# Patient Record
Sex: FEMALE | Race: WHITE | Employment: OTHER | URBAN - METROPOLITAN AREA
[De-identification: names, ages, dates, MRNs, and addresses within clinical notes are randomized per-mention and may not be internally consistent; named-entity substitution may affect disease eponyms.]

---

## 2018-08-16 ENCOUNTER — HOSPITAL ENCOUNTER (OUTPATIENT)
Dept: LAB | Age: 83
Discharge: HOME | End: 2018-08-16

## 2018-08-16 PROCEDURE — 88305 TISSUE EXAM BY PATHOLOGIST: CPT | Performed by: SURGERY

## 2019-11-18 ENCOUNTER — OFFICE VISIT (OUTPATIENT)
Dept: GERIATRIC MEDICINE | Age: 84
End: 2019-11-18

## 2019-11-18 VITALS
RESPIRATION RATE: 16 BRPM | OXYGEN SATURATION: 97 % | BODY MASS INDEX: 23.17 KG/M2 | HEART RATE: 62 BPM | WEIGHT: 107.4 LBS | DIASTOLIC BLOOD PRESSURE: 62 MMHG | SYSTOLIC BLOOD PRESSURE: 108 MMHG | HEIGHT: 57 IN

## 2019-11-18 DIAGNOSIS — I25.5 ISCHEMIC CARDIOMYOPATHY: ICD-10-CM

## 2019-11-18 DIAGNOSIS — Z66 DNR (DO NOT RESUSCITATE): ICD-10-CM

## 2019-11-18 DIAGNOSIS — H90.6 MIXED CONDUCTIVE AND SENSORINEURAL HEARING LOSS OF BOTH EARS: ICD-10-CM

## 2019-11-18 DIAGNOSIS — I25.10 CORONARY ARTERY DISEASE INVOLVING NATIVE CORONARY ARTERY OF NATIVE HEART WITHOUT ANGINA PECTORIS: ICD-10-CM

## 2019-11-18 DIAGNOSIS — I48.0 PAROXYSMAL ATRIAL FIBRILLATION (HCC): ICD-10-CM

## 2019-11-18 DIAGNOSIS — E78.00 PURE HYPERCHOLESTEROLEMIA: ICD-10-CM

## 2019-11-18 DIAGNOSIS — I50.42 CHRONIC COMBINED SYSTOLIC AND DIASTOLIC CONGESTIVE HEART FAILURE (HCC): Primary | ICD-10-CM

## 2019-11-18 DIAGNOSIS — M81.0 AGE-RELATED OSTEOPOROSIS WITHOUT CURRENT PATHOLOGICAL FRACTURE: ICD-10-CM

## 2019-11-18 DIAGNOSIS — Z12.39 SCREENING FOR MALIGNANT NEOPLASM OF BREAST: ICD-10-CM

## 2019-11-18 DIAGNOSIS — I10 ESSENTIAL HYPERTENSION: ICD-10-CM

## 2019-11-18 DIAGNOSIS — Z12.31 ENCOUNTER FOR SCREENING MAMMOGRAM FOR MALIGNANT NEOPLASM OF BREAST: ICD-10-CM

## 2019-11-18 DIAGNOSIS — Z71.89 ACP (ADVANCE CARE PLANNING): ICD-10-CM

## 2019-11-18 DIAGNOSIS — H61.23 BILATERAL IMPACTED CERUMEN: ICD-10-CM

## 2019-11-18 DIAGNOSIS — Z76.89 ENCOUNTER TO ESTABLISH CARE: ICD-10-CM

## 2019-11-18 DIAGNOSIS — I36.1 NONRHEUMATIC TRICUSPID VALVE REGURGITATION: ICD-10-CM

## 2019-11-18 DIAGNOSIS — Z00.00 MEDICARE ANNUAL WELLNESS VISIT, SUBSEQUENT: ICD-10-CM

## 2019-11-18 PROBLEM — I34.0 MITRAL REGURGITATION: Chronic | Status: ACTIVE | Noted: 2019-11-18

## 2019-11-18 PROBLEM — E55.9 VITAMIN D DEFICIENCY: Chronic | Status: ACTIVE | Noted: 2019-11-18

## 2019-11-18 PROBLEM — I07.1 TRICUSPID REGURGITATION: Chronic | Status: ACTIVE | Noted: 2019-11-18

## 2019-11-18 PROBLEM — I50.9 CONGESTIVE HEART FAILURE (HCC): Chronic | Status: ACTIVE | Noted: 2019-11-18

## 2019-11-18 PROBLEM — I87.8 STASIS, VENOUS: Chronic | Status: ACTIVE | Noted: 2019-11-18

## 2019-11-18 PROBLEM — I42.9 CARDIOMYOPATHY (HCC): Chronic | Status: ACTIVE | Noted: 2019-11-18

## 2019-11-18 PROBLEM — E78.5 HYPERLIPEMIA: Chronic | Status: ACTIVE | Noted: 2019-11-18

## 2019-11-18 RX ORDER — ASPIRIN 81 MG/1
TABLET ORAL DAILY
COMMUNITY

## 2019-11-18 RX ORDER — FUROSEMIDE 40 MG/1
40 TABLET ORAL DAILY
Refills: 3 | Status: ON HOLD | COMMUNITY
Start: 2019-09-30 | End: 2021-06-12 | Stop reason: SDUPTHER

## 2019-11-18 RX ORDER — SPIRONOLACTONE 25 MG/1
25 TABLET ORAL DAILY
Refills: 2 | Status: ON HOLD | COMMUNITY
Start: 2019-09-30 | End: 2021-06-12 | Stop reason: SDUPTHER

## 2019-11-18 RX ORDER — CALCIUM CARB/VITAMIN D3/VIT K1 650MG-12.5
1 TABLET,CHEWABLE ORAL DAILY
Qty: 100 PIECE | Refills: 4 | Status: ON HOLD | OUTPATIENT
Start: 2019-11-18 | End: 2021-06-10

## 2019-11-18 NOTE — PROGRESS NOTES
Reason for Visit   Ani Villalobos is a 80 y.o. female patient who presents today for :  Chief Complaint   Patient presents with   Fabby Nguyen Establish Care     Patient here with her daughter to establish care with PCP    Annual Wellness Visit     Yearly visit     Treatment Plan / Follow Up: Follow-up and Dispositions    · Return in about 1 week (around 11/25/2019) for in office to review imaging or labs results. - New patient establishing today as her  of many years just passed in my care earlier this year and she has not had her medical care updated since dealing with his needs. She reports being a healthy aging woman without many complaints of illness. She sees the Cardiologist Dr. Vishal To regularly due to her chronic congestive heart failure and management of that. Otherwise she see's an eye dr at [facility] for her cataracts but has only had to have 1 fixed and the other is still not ready to be fixed yet. - SOB- she has some complaints of SOB with activity or with exertion of energy. She has a significant history of heart failure, CAD, and cardiomyopathy, managed with Dr. Vishal To. She has overactive bladder when she doesn't take her diuretics until later in the day causing her to be up 3-4 times per night with nocturia but if she takes it early in the AM she is able to sleep soundly without disturbance. - Wellness initiatives - Dexa scan previously done many years ago, she would like to refrain from having that done if possible as she knows she has osteoporosis but she does agree to take the Vi actin oral calcium vitamin d chews daily in the Caramel flavor to help with her bone health. She reports a fracture of her left upper arm at age 79 but did not have to have it surgically fixed it was healed by medical treatment. I have ordered them to be delivered. - Mammogram - she has not had a follow up in many years.  She is okay with having the test and I have ordered this now for her daughter to take her to get it done. - Ear wax- both ears are impacted with wax. I have attempted as well as Dr. Neville Springer attempted to remove the wax for her hearing to be better. We had to go to the water device with use of tools to remove the ear wax. She tolerated this well. She is going to use the debrox once weekly for this to maintain the wax from re-oc cumulating. Diagnosis:        ICD-10-CM ICD-9-CM    1. Chronic combined systolic and diastolic congestive heart failure (HCC) I50.42 428.42 AMYLASE     428.0 CBC WITH AUTOMATED DIFF      COLLECTION VENOUS BLOOD,VENIPUNCTURE      HEMOGLOBIN A1C WITH EAG      LIPASE      LIPID PANEL      MAGNESIUM      METABOLIC PANEL, COMPREHENSIVE      MICROALBUMIN, UR, RAND W/ MICROALB/CREAT RATIO      NT-PRO BNP      THYROID PANEL W/TSH      UA WITH REFLEX MICRO AND CULTURE      VITAMIN B12 & FOLATE      VITAMIN D, 25 HYDROXY      PHOSPHORUS      CREATINE KINASE (CK), MB/TOTAL      TROPONIN I   2. Pure hypercholesterolemia E78.00 272.0 AMYLASE      CBC WITH AUTOMATED DIFF      COLLECTION VENOUS BLOOD,VENIPUNCTURE      HEMOGLOBIN A1C WITH EAG      LIPASE      LIPID PANEL      MAGNESIUM      METABOLIC PANEL, COMPREHENSIVE      MICROALBUMIN, UR, RAND W/ MICROALB/CREAT RATIO      NT-PRO BNP      THYROID PANEL W/TSH      UA WITH REFLEX MICRO AND CULTURE      VITAMIN B12 & FOLATE      VITAMIN D, 25 HYDROXY      PHOSPHORUS      CREATINE KINASE (CK), MB/TOTAL      TROPONIN I   3.  Ischemic cardiomyopathy I25.5 414.8 AMYLASE      CBC WITH AUTOMATED DIFF      COLLECTION VENOUS BLOOD,VENIPUNCTURE      HEMOGLOBIN A1C WITH EAG      LIPASE      LIPID PANEL      MAGNESIUM      METABOLIC PANEL, COMPREHENSIVE      MICROALBUMIN, UR, RAND W/ MICROALB/CREAT RATIO      NT-PRO BNP      THYROID PANEL W/TSH      UA WITH REFLEX MICRO AND CULTURE      VITAMIN B12 & FOLATE      VITAMIN D, 25 HYDROXY      PHOSPHORUS      CREATINE KINASE (CK), MB/TOTAL      TROPONIN I   4. Paroxysmal atrial fibrillation (HCC) I48.0 427.31 AMYLASE      CBC WITH AUTOMATED DIFF      COLLECTION VENOUS BLOOD,VENIPUNCTURE      HEMOGLOBIN A1C WITH EAG      LIPASE      LIPID PANEL      MAGNESIUM      METABOLIC PANEL, COMPREHENSIVE      MICROALBUMIN, UR, RAND W/ MICROALB/CREAT RATIO      NT-PRO BNP      THYROID PANEL W/TSH      UA WITH REFLEX MICRO AND CULTURE      VITAMIN B12 & FOLATE      VITAMIN D, 25 HYDROXY      PHOSPHORUS      CREATINE KINASE (CK), MB/TOTAL      TROPONIN I   5.  Coronary artery disease involving native coronary artery of native heart without angina pectoris I25.10 414.01 AMYLASE      CBC WITH AUTOMATED DIFF      COLLECTION VENOUS BLOOD,VENIPUNCTURE      HEMOGLOBIN A1C WITH EAG      LIPASE      LIPID PANEL      MAGNESIUM      METABOLIC PANEL, COMPREHENSIVE      MICROALBUMIN, UR, RAND W/ MICROALB/CREAT RATIO      NT-PRO BNP      THYROID PANEL W/TSH      UA WITH REFLEX MICRO AND CULTURE      VITAMIN B12 & FOLATE      VITAMIN D, 25 HYDROXY      PHOSPHORUS      CREATINE KINASE (CK), MB/TOTAL      TROPONIN I   6. Essential hypertension I10 401.9 AMYLASE      CBC WITH AUTOMATED DIFF      COLLECTION VENOUS BLOOD,VENIPUNCTURE      HEMOGLOBIN A1C WITH EAG      LIPASE      LIPID PANEL      MAGNESIUM      METABOLIC PANEL, COMPREHENSIVE      MICROALBUMIN, UR, RAND W/ MICROALB/CREAT RATIO      NT-PRO BNP      THYROID PANEL W/TSH      UA WITH REFLEX MICRO AND CULTURE      VITAMIN B12 & FOLATE      VITAMIN D, 25 HYDROXY      PHOSPHORUS      CREATINE KINASE (CK), MB/TOTAL      TROPONIN I   7. Age-related osteoporosis without current pathological fracture M81.0 733.01 AMYLASE      CBC WITH AUTOMATED DIFF      COLLECTION VENOUS BLOOD,VENIPUNCTURE      HEMOGLOBIN A1C WITH EAG      LIPASE      LIPID PANEL      MAGNESIUM      METABOLIC PANEL, COMPREHENSIVE      MICROALBUMIN, UR, RAND W/ MICROALB/CREAT RATIO      NT-PRO BNP      THYROID PANEL W/TSH      UA WITH REFLEX MICRO AND CULTURE      VITAMIN B12 & FOLATE      VITAMIN D, 25 HYDROXY PHOSPHORUS      CREATINE KINASE (CK), MB/TOTAL      TROPONIN I   8. Nonrheumatic tricuspid valve regurgitation I36.1 424.2 AMYLASE      CBC WITH AUTOMATED DIFF      COLLECTION VENOUS BLOOD,VENIPUNCTURE      HEMOGLOBIN A1C WITH EAG      LIPASE      LIPID PANEL      MAGNESIUM      METABOLIC PANEL, COMPREHENSIVE      MICROALBUMIN, UR, RAND W/ MICROALB/CREAT RATIO      NT-PRO BNP      THYROID PANEL W/TSH      UA WITH REFLEX MICRO AND CULTURE      VITAMIN B12 & FOLATE      VITAMIN D, 25 HYDROXY      PHOSPHORUS      CREATINE KINASE (CK), MB/TOTAL      TROPONIN I   9. Screening for malignant neoplasm of breast Z12.39 V76.10 BRICE 3D GLEN W MAMMO BI SCREENING INCL CAD   8. Encounter for screening mammogram for malignant neoplasm of breast  Z12.31 V76.12 BRICE 3D GLEN W MAMMO BI SCREENING INCL CAD   6. Bilateral impacted cerumen H61.23 380.4 REMOVE IMPACTED EAR WAX      carbamide peroxide (DEBROX) 6.5 % otic solution   12. 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 Encounter to establish care Z76.89 V65.8    14. Medicare annual wellness visit, subsequent Z00.00 V70.0 DO NOT RESUSCITATE      PA ANNUAL ALCOHOL SCREEN 15 MIN      PA INTENS BEHAVE THER CARDIO DX      PA DEPRESSION SCREEN ANNUAL      PA  BENEFIT/RISK AREDS FOR MAC DEGENERATION      PA MEDICATION LIST DOCUMENTED IN MEDICAL RECORD      PA PRESENCE/ABSENCE URINARY INCONTINENCE ASSESSED      PA PT FALLS ASSESS DOC 0-1 FALLS W/OUT INJ PAST YR   15. ACP (advance care planning) Z71.89 V65.49 DO NOT RESUSCITATE      PA ANNUAL ALCOHOL SCREEN 15 MIN      PA INTENS BEHAVE THER CARDIO DX      PA DEPRESSION SCREEN ANNUAL      PA  BENEFIT/RISK AREDS FOR MAC DEGENERATION      PA MEDICATION LIST DOCUMENTED IN MEDICAL RECORD      PA PRESENCE/ABSENCE URINARY INCONTINENCE ASSESSED      PA PT FALLS ASSESS DOC 0-1 FALLS W/OUT INJ PAST YR   16.  DNR (do not resuscitate) Z66 V49.86 DO NOT RESUSCITATE      LA ANNUAL ALCOHOL SCREEN 15 MIN      LA INTENS BEHAVE THER CARDIO DX      LA DEPRESSION SCREEN ANNUAL      LA  BENEFIT/RISK AREDS FOR MAC DEGENERATION      LA MEDICATION LIST DOCUMENTED IN MEDICAL RECORD      LA PRESENCE/ABSENCE URINARY INCONTINENCE ASSESSED      LA PT FALLS ASSESS DOC 0-1 FALLS W/OUT INJ PAST YR        Orders Placed This Encounter    BRICE 3D GLEN W MAMMO BI SCREENING INCL CAD     Standing Status:   Future     Standing Expiration Date:   1/18/2020     Order Specific Question:   Reason for Exam     Answer:   screening for malignant neoplasia bilateral breast    AMYLASE    CBC WITH AUTOMATED DIFF    HEMOGLOBIN A1C WITH EAG    LIPASE    LIPID PANEL    MAGNESIUM    METABOLIC PANEL, COMPREHENSIVE    MICROALBUMIN, UR, RAND W/ MICROALB/CREAT RATIO    NT-PRO BNP    THYROID PANEL W/TSH     Free thyroxine index; T3 uptake (THBR); thyroid-stimulating hormone (TSH); thyroxine (T4)    UA WITH REFLEX MICRO AND CULTURE    VITAMIN B12 & FOLATE    VITAMIN D, 25 HYDROXY    PHOSPHORUS    CREATINE KINASE (CK), MB/TOTAL    TROPONIN I    DO NOT RESUSCITATE    LA PRESENCE/ABSENCE URINARY INCONTINENCE ASSESSED    LA PT FALLS ASSESS DOC 0-1 FALLS W/OUT INJ PAST YR    LA ANNUAL ALCOHOL SCREEN 15 MIN    LA INTENS BEHAVE THER CARDIO DX    LA DEPRESSION SCREEN ANNUAL    LA  BENEFIT/RISK AREDS FOR MAC DEGENERATION    LA MEDICATION LIST DOCUMENTED IN MEDICAL RECORD    COLLECTION VENOUS BLOOD,VENIPUNCTURE    REMOVE IMPACTED EAR WAX    spironolactone (ALDACTONE) 25 mg tablet     Sig: Take 25 mg by mouth daily. Refill:  2    furosemide (LASIX) 40 mg tablet     Sig: Take 40 mg by mouth daily. Refill:  3    aspirin delayed-release 81 mg tablet     Sig: Take  by mouth daily.  carbamide peroxide (DEBROX) 6.5 % otic solution     Sig: Place 5 drops in both ear canals once weekly before bed to prevent cerumen production.      Dispense:  7.5 mL     Refill:  10     Deliver to [place]      Objective:     Vitals:    11/18/19 1104   BP: 108/62   Pulse: 62   Resp: 16   SpO2: 97%   Weight: 107 lb 6.4 oz (48.7 kg)   Height: 4' 9.09\" (1.45 m)     Wt Readings from Last 3 Encounters:   11/18/19 107 lb 6.4 oz (48.7 kg)     BP Readings from Last 3 Encounters:   11/18/19 108/62     Review of Systems   Constitutional: Negative for activity change, appetite change, chills, fatigue and fever. HENT: Positive for hearing loss. Negative for congestion, dental problem, postnasal drip, sinus pressure, sneezing, sore throat and trouble swallowing. Eyes: Negative for discharge, redness and visual disturbance. Respiratory: Positive for shortness of breath. Negative for apnea, cough, chest tightness and wheezing. Cardiovascular: Positive for leg swelling. Negative for chest pain and palpitations. Gastrointestinal: Negative for abdominal distention, abdominal pain, blood in stool, constipation, diarrhea, nausea and vomiting. Endocrine: Negative for polydipsia, polyphagia and polyuria. Genitourinary: Negative for flank pain, frequency and urgency. Musculoskeletal: Negative for arthralgias, gait problem, joint swelling and neck pain. Skin: Negative for color change, pallor, rash and wound. Allergic/Immunologic: Negative for environmental allergies and food allergies. Neurological: Negative for dizziness, tremors, weakness, light-headedness, numbness and headaches. Hematological: Negative for adenopathy. Psychiatric/Behavioral: Negative for agitation, confusion and sleep disturbance. The patient is not nervous/anxious. Physical Exam   Constitutional: She is oriented to person, place, and time and well-developed, well-nourished, and in no distress. Vital signs are normal. She appears to not be writhing in pain, not malnourished and not dehydrated. She appears healthy. She does not have a sickly appearance. No distress.    Thin, frail, fragile, elderly,  female. Presents today with one of her 4 daughters to establish care since her  passed away about 4-5 months ago. HENT:   Head: Normocephalic and atraumatic. Right Ear: Tympanic membrane and external ear normal. A foreign body is present. Decreased hearing is noted. Left Ear: Tympanic membrane and external ear normal. A foreign body is present. Decreased hearing is noted. Nose: Nose normal.   Mouth/Throat: Uvula is midline, oropharynx is clear and moist and mucous membranes are normal. Mucous membranes are not pale, not dry and not cyanotic. No oral lesions. No uvula swelling. No posterior oropharyngeal edema or posterior oropharyngeal erythema. Bilateral Ear Canal Irrigation Procedure:    Procedure discussed with patient including risks and benefits. Gardenia Meigs has no further questions at this time. Supplies obtained. Ear irrigation tools obtained and bottle filled with 1:1 peroxide and warm water. Left ear canal irrigation using the soft flexible \"Elephant\" ear system. Hard impacted cerumen was removed with instrumentation. Right ear canal irrigation using the soft flexible \"Elephant\" ear system. Hard impacted cerumen was removed with instrumentation. Right ear was: 100% occluded with hard cerumen. Left ear was: 100% occluded with hard cerumen. Following the procedure the Right TM was visualized and noted to be intact. The Left TM was visualized and noted to be intact. External canal is without abrasions or erythema. Orders were written to utilize Debrox in each ear once weekly to keep the cerumen from returning. [facility] tolerated the procedure well with no complications. Eyes: Pupils are equal, round, and reactive to light. Conjunctivae, EOM and lids are normal. Lids are everted and swept, no foreign bodies found. Neck: Trachea normal, normal range of motion and full passive range of motion without pain. Neck supple. No hepatojugular reflux and no JVD present. Carotid bruit is not present. No thyromegaly present. Cardiovascular: Regular rhythm, S1 normal, S2 normal, intact distal pulses and normal pulses. Frequent extrasystoles are present. Bradycardia present. Exam reveals distant heart sounds. Exam reveals no gallop and no friction rub. No murmur heard. Scant non pitting edema present in bilateral lower extremities. Pulmonary/Chest: Effort normal and breath sounds normal.   Abdominal: Soft. Normal appearance and bowel sounds are normal. There is no hepatosplenomegaly. There is no tenderness. There is no CVA tenderness. Neurological: She is alert and oriented to person, place, and time. She has normal motor skills, normal sensation, normal strength, normal reflexes and intact cranial nerves. She displays atrophy. She displays no weakness, facial symmetry and normal stance. She exhibits normal muscle tone. Gait normal. Coordination and gait normal. GCS score is 15. Skin: Skin is warm, dry and intact. No bruising and no rash noted. She is not diaphoretic. No cyanosis. No pallor. Nails show no clubbing. Psychiatric: Mood, memory, affect and judgment normal.   Baseline mood and affect unchanged from normal.    Nursing note and vitals reviewed. Subjective: Allergies   Allergen Reactions    Evista [Raloxifene] Other (comments)    Levaquin [Levofloxacin] Unknown (comments)    Pcn [Penicillins] Unknown (comments)     Prior to Admission medications    Medication Sig Start Date End Date Taking? Authorizing Provider   spironolactone (ALDACTONE) 25 mg tablet Take 25 mg by mouth daily. 9/30/19  Yes Provider, Historical   furosemide (LASIX) 40 mg tablet Take 40 mg by mouth daily. 9/30/19  Yes Provider, Historical   aspirin delayed-release 81 mg tablet Take  by mouth daily.    Yes Provider, Historical   carbamide peroxide (DEBROX) 6.5 % otic solution Place 5 drops in both ear canals once weekly before bed to prevent cerumen production.  11/18/19  Yes cR Hartley NP     Past Medical History:   Diagnosis Date    CAD (coronary artery disease)     Cardiomyopathy ([address]     Congestive heart failure (HCC)     HTN (hypertension)     Hyperlipemia     Mitral regurgitation     Osteoporosis     Paroxysmal atrial fibrillation (HCC)     Stasis, venous     Tricuspid regurgitation     Vitamin D deficiency      Past Surgical History:   Procedure Laterality Date    HX APPENDECTOMY      HX COLONOSCOPY  06/2004    HX TONSIL AND ADENOIDECTOMY      HX TUBAL LIGATION        Social History     Tobacco Use    Smoking status: Never Smoker    Smokeless tobacco: Never Used   Substance Use Topics    Alcohol use: Never     Frequency: Never    Drug use: Never      Family History   Problem Relation Age of Onset    Heart Failure Mother     Heart Failure Father     Thyroid Cancer Sister     Breast Cancer Other      Functional Assessment:   ADL FUNCTIONALITY:  ADL Assessment 11/18/2019   Feeding yourself No Help Needed   Getting from bed to chair No Help Needed   Getting dressed No Help Needed   Bathing or showering No Help Needed   Walk across the room (includes cane/walker) No Help Needed   Using the telphone No Help Needed   Taking your medications No Help Needed   Preparing meals No Help Needed   Managing money (expenses/bills) No Help Needed   Moderately strenuous housework (laundry) No Help Needed   Shopping for personal items (toiletries/medicines) No Help Needed   Shopping for groceries Help Needed   Driving Help Needed   Climbing a flight of stairs No Help Needed   Getting to places beyond walking distances No Help Needed     DEPRESSION SCREENING:   3 most recent PHQ Screens 11/18/2019   Little interest or pleasure in doing things Not at all   Feeling down, depressed, irritable, or hopeless Not at all   Total Score PHQ 2 0   Trouble falling or staying asleep, or sleeping too much Not at all   Feeling tired or having little energy Not at all   Poor appetite, weight loss, or overeating Not at all   Feeling bad about yourself - or that you are a failure or have let yourself or your family down Not at all   Trouble concentrating on things such as school, work, reading, or watching TV Not at all   Moving or speaking so slowly that other people could have noticed; or the opposite being so fidgety that others notice Not at all   Thoughts of being better off dead, or hurting yourself in some way Not at all   PHQ 9 Score 0      Mini Mental State Exam 2019   What is the Year 1   What is the Season 1   What is the Date 1   What is the Day 1   What is the Month 1   Where are we State 1   Where are we Country 1   Where are we [place] or [place] 1   Where are we Floor 1   Name three objects, then ask the patient to say them 3   Serial sevens Subtract 7 from 100 in increments 2   Ask for the three objects repeated above 2   Name a pencil 1   Name a watch 1   Have the patient repeat this phrase \"No ifs, ands, or buts\" 1   Three stage command: Take the paper in your right hand 1   Fold the paper in half 1   Put the paper on the floor 1   Read and obey the followin [address] 1   Have the patient write a sentence 1   Have the patient copy a figure 1   Mini Mental Score 26     FALL RISK:   Fall Risk Assessment, last 12 mths 2019   Able to walk? Yes   Fall in past 12 months? No      ABUSE SCREEN:   Abuse Screening Questionnaire 2019   Do you ever feel afraid of your partner? N   Are you in a relationship with someone who physically or mentally threatens you? N   Is it safe for you to go home? Y      No flowsheet data found. CHANGES IN TREATMENT:    The following treatment modalities have been discontinued by the provider today:   There are no discontinued medications. MOST RECENT LABORATORY RESULTS:      No visits with results within 3 Month(s) from this visit.    Latest known visit with results is:   Results on 2009   Component Date Value Ref Range Status    Vitamin D 25-Hydroxy 11/19/2009 41  30 - 80 ng/mL Final    Comment: (NOTE)                           TEST INFORMATION: VITAMIN D, 25-HYDROXY                           This assay accurately quantifies the sum of vitamin D3,                           25-hydroxy and vitamin D2, 25-hydroxy. Deficiency:  Less than 20 ng/mL                                                      Insufficiency:  20-29 ng/mL                                                      Optimum Level:  30-80 ng/mL                                                      Possible Toxicity:  Greater than 80 ng/mL                           Performed by [place] [id number], [id number] [facility] [phone]                           [email or web address], Fara Gonzales MD, Lab. Director    Calcium 11/19/2009 9.2  8.5 - 10.1 MG/DL Final    PTH, Intact 11/19/2009 98.1* 14 - 72 pg/mL Final    Comment: Guidelines for Total CA and Intact PTH for various diseases:                           CA <10 and PTH <5 suggestive of Primary Hypoparathyroidism. CA >10.6 and PTH <5  suggestive of Hypercalcemia of Malignancy. CA >10.2 and PTH >52 suggestive of Primary Hyperparathyroidism. Results for orders placed or performed in visit on 11/19/09   VITAMIN D, 25 HYDROXY   Result Value Ref Range    Vitamin D 25-Hydroxy 41 30 - 80 ng/mL   PTH INTACT   Result Value Ref Range    Calcium 9.2 8.5 - 10.1 MG/DL    PTH, Intact 98.1 (H) 14 - 72 pg/mL      Disclaimer:   Ms. Terrence Herrera has been advised to call or return to our office if symptoms worsen/change/persist. We as a care team including the patient; discussed expected course/resolution/complications of diagnosis in detail today. Medication risks/benefits/costs/interactions/alternatives discussed Karlos Reynoso was given an after visit summary which includes diagnoses, current medications, & vitals. Darlene Kirkland expressed understanding with the diagnosis and plan.

## 2019-11-18 NOTE — PATIENT INSTRUCTIONS
Learning About Atrial Fibrillation  What is atrial fibrillation? Atrial fibrillation (say \"AY-tree-anibal fkt-mzrd-JKW-shun\") is the most common type of irregular heartbeat (arrhythmia). Normally, the heart beats in a strong, steady rhythm. In atrial fibrillation, a problem with the heart's electrical system causes the two upper parts of the heart (the atria) to quiver, or fibrillate. Your heart rate also may be faster than normal.  Atrial fibrillation can be dangerous because if the heartbeat isn't strong and steady, blood can collect, or pool, in the atria. And pooled blood is more likely to form clots. Clots can travel to the brain, block blood flow, and cause a stroke. Atrial fibrillation can also lead to heart failure. Treatment for atrial fibrillation helps prevent stroke and heart failure. It also helps relieve symptoms. Atrial fibrillation is often caused by another heart problem. It may happen after heart surgery. It may also be caused by other problems, such as an overactive thyroid gland or lung disease. Many people with atrial fibrillation are able to live full and active lives. What are the symptoms? Some people feel symptoms when they have episodes of atrial fibrillation. But other people don't notice any symptoms. If you have symptoms, you may feel:  · A fluttering, racing, or pounding feeling in your chest called palpitations. · Weak or tired. · Dizzy or lightheaded. · Short of breath. · Chest pain. · Confused. You may notice signs of atrial fibrillation when you check your pulse. Your pulse may seem uneven or fast.  What can you expect when you have atrial fibrillation? At first, spells of atrial fibrillation may come on suddenly and last a short time. It may go away on its own or it goes away after treatment. This is called paroxysmal atrial fibrillation. Over time, the spells may last longer and occur more often. They often don't go away on their own.   How is it treated? Treatments can help you feel better and prevent future problems, especially stroke and heart failure. The main types of treatment slow the heart rate, control the heart rhythm, and help prevent stroke. Your treatment will depend on the cause of your atrial fibrillation, your symptoms, and your risk for stroke. · Heart rate treatment. Medicine may be used to slow your heart rate. Your heartbeat may still be irregular. But these medicines keep your heart from beating too fast. They may also help relieve your symptoms. · Heart rhythm treatment. Different treatments may be used to try to stop atrial fibrillation and keep it from returning. They can also relieve symptoms. These treatments include medicine, electrical cardioversion to shock the heart back to a normal rhythm, a procedure called catheter ablation, and heart surgery. · Stroke prevention. You and your doctor can decide how to lower your risk. You may decide to take a blood-thinning medicine called an anticoagulant. How can you live well with it? You can live well and help manage atrial fibrillation by having a heart-healthy lifestyle. This lifestyle may help reduce how often you have episodes of atrial fibrillation. If you are overweight, losing weight can help relieve symptoms. To have a heart-healthy lifestyle:  · Don't smoke. · Eat heart-healthy foods. · Be active. Talk to your doctor about what type and level of exercise is safe for you. · Stay at a healthy weight. Lose weight if you need to. · Avoid alcohol if it triggers symptoms. · Manage other health problems such as high blood pressure, high cholesterol, and diabetes. · Avoid getting sick from the flu. Get a flu shot every year. · Manage stress. Where can you learn more? Go to http://roshan-flor.info/. Enter [phone] in the search box to learn more about \"Learning About Atrial Fibrillation. \"  Current as of: April 9, 2019  Content Version: 12.2  © 9750-2213 [employer]. Care instructions adapted under license by [employer] (which disclaims liability or warranty for this information). If you have questions about a medical condition or this instruction, always ask your healthcare professional. [address] any warranty or liability for your use of this information.

## 2019-11-18 NOTE — PROGRESS NOTES
ADVISED PATIENT OF THE FOLLOWING HEALTH MAINTAINCE DUE  Health Maintenance Due   Topic Date Due    DTaP/Tdap/Td series (1 - Tdap) 03/02/1949    GLAUCOMA SCREENING Q2Y  03/02/1993    Pneumococcal 65+ years (1 of 1 - PPSV23) 03/02/1993    MEDICARE YEARLY EXAM  10/28/2019      Chief Complaint   Patient presents with   [address]     Patient here with her daughter to establish care with PCP    Annual Wellness Visit     Yearly visit       1. Have you been to the ER, urgent care clinic since your last visit? Hospitalized since your last visit? No    2. Have you seen or consulted any other health care providers outside of the [address] [place] [address] since your last visit? Include any DEXA scan, mammography  or colon screening. Yes will request records    3. Do you have an Advance Directive on file? yes    4. Do you have a DNR on file? DNR    Patient is accompanied by self and daughter I have received verbal consent from Elida Hunter to discuss any/all medical information while they are present in the room. [facility] [address] [place] [id number]  Phone: [phone] Fax: [phone]        Patient reminded during visit to bring all medication bottles, OTC medications to all appointments.      Elida Hunter presents for lab draw ordered by Reinhold Curling RN MSN ACNPC-AG-NP    The following labs were drawn and sent to lab by Romayne Samuel, LPN:    CBC, Lipid Profile, CMP, BNP, HgA1C and Tropinin !, CK-MB, Phosphours, Vit D, Vit B12 folate, Thyroid, Lipase, Amylase,     The following tubes were sent:    Lavender  ( 3) and Tiger (2)    Patient tolerated procedure well, blood obtained via venipuncture to left antecubital     Urine collected for UA and Micro

## 2019-11-18 NOTE — PROGRESS NOTES
Reason For Visit:     Chief Complaint   Patient presents with   [facility] Establish Care     Patient here with her daughter to establish care with PCP    Annual Wellness Visit     Yearly visit     Gardenia Meigs is a 80 y.o. female who presents for an annual Medicare Wellness Visit. Patient History:   PSH: Reviewed with patient  Past Surgical History:   Procedure Laterality Date    HX APPENDECTOMY      HX COLONOSCOPY  06/2004    HX TONSIL AND ADENOIDECTOMY      HX TUBAL LIGATION        SH: Reviewed with patient  Social History     Tobacco Use    Smoking status: Never Smoker    Smokeless tobacco: Never Used   Substance Use Topics    Alcohol use: Never     Frequency: Never    Drug use: Never     FH: Reviewed with patient  Family History   Problem Relation Age of Onset    Heart Failure Mother     Heart Failure Father     Thyroid Cancer Sister     Breast Cancer Other      Medications/Allergies: Reviewed with patient. Current Outpatient Medications on File Prior to Visit   Medication Sig Dispense Refill    spironolactone (ALDACTONE) 25 mg tablet Take 25 mg by mouth daily. 2    furosemide (LASIX) 40 mg tablet Take 40 mg by mouth daily. 3    aspirin delayed-release 81 mg tablet Take  by mouth daily. No current facility-administered medications on file prior to visit.        Allergies   Allergen Reactions    Evista [Raloxifene] Other (comments)    Levaquin [Levofloxacin] Unknown (comments)    Pcn [Penicillins] Unknown (comments)     Patient Care Team:  Alverto Potts NP as PCP - General (Nurse Practitioner)  Alverto Potts NP as PCP - GERONTOLOGY (Nurse Practitioner)  Alanis Gagnon MD as Physician (Internal Medicine)  Alissa Gibbs MD as Consulting Provider [facility])  Objective:     Visit Vitals  /62 (BP 1 Location: Left arm, BP Patient Position: Sitting)   Pulse 62   Resp 16   Ht 4' 9.09\" (1.45 m)   Wt 107 lb 6.4 oz (48.7 kg)   SpO2 97%   BMI 23.17 kg/m²    Body mass index is 23.17 kg/m². Last Weight Metrics:  Weight Loss Metrics 11/18/2019   Today's Wt 107 lb 6.4 oz   BMI 23.17 kg/m2     No physical exam was performed today per Medicare Wellness Guidelines.    Health Maintenance:   Daily Aspirin: yes   Immunizations: stated as up to date, no records available  Health Maintenance reviewed   Health Maintenance Due   Topic Date Due    DTaP/Tdap/Td series (1 - Tdap) 03/02/1949    GLAUCOMA SCREENING Q2Y  03/02/1993    Pneumococcal 65+ years (1 of 1 - PPSV23) 03/02/1993    MEDICARE YEARLY EXAM  10/28/2019      Functional Assessment:   ALCOHOL SCREENING:   How often do you have a drink containing alcohol: Never  How many drinks do you have on a typical day when you are drinking: None  How often do you have 6 or more drinks on one occasion: Never  How often during the last year have you found that you were not able to stop drinking once you had started: Never  How often during the last year have you failed to do what was normally expected from you because of drinking: Never  How often during the last year have you needed a first drink in the morning to get yourself going after a heavy drinking session: Never  How often during the last year have you had a feeling of guilt or remorse after drinking: Never  How often during the last year have you been unable to remember what happened the night before because you had been drinking: Never  Have you or someone else been injured as a result of your drinking: Never  Have you or someone else been injured as a result of your drinking: Never  Has a relative or friend, or a doctor or other health worker been concerned about your drinking or suggested you cut down: Never  AUDIT Score: 0     ADL FUNCTIONALITY  ADL Assessment 11/18/2019   Feeding yourself No Help Needed   Getting from bed to chair No Help Needed   Getting dressed No Help Needed   Bathing or showering No Help Needed   Walk across the room (includes cane/walker) No Help Needed Using the telphone No Help Needed   Taking your medications No Help Needed   Preparing meals No Help Needed   Managing money (expenses/bills) No Help Needed   Moderately strenuous housework (laundry) No Help Needed   Shopping for personal items (toiletries/medicines) No Help Needed   Shopping for groceries Help Needed   Driving Help Needed   Climbing a flight of stairs No Help Needed   Getting to places beyond walking distances No Help Needed     DEPRESSION SCREENING:   3 most recent PHQ Screens 11/18/2019   Little interest or pleasure in doing things Not at all   Feeling down, depressed, irritable, or hopeless Not at all   Total Score PHQ 2 0   Trouble falling or staying asleep, or sleeping too much Not at all   Feeling tired or having little energy Not at all   Poor appetite, weight loss, or overeating Not at all   Feeling bad about yourself - or that you are a failure or have let yourself or your family down Not at all   Trouble concentrating on things such as school, work, reading, or watching TV Not at all   Moving or speaking so slowly that other people could have noticed; or the opposite being so fidgety that others notice Not at all   Thoughts of being better off dead, or hurting yourself in some way Not at all   PHQ 9 Score 0      Mini Mental State Exam 11/18/2019   What is the Year 1   What is the Season 1   What is the Date 1   What is the Day 1   What is the Month 1   Where are we State 1   Where are we Country 1   Where are we [facility] [place] or [place] 1   Where are we Floor 1   Name three objects, then ask the patient to say them 3   Serial sevens Subtract 7 from 100 in increments 2   Ask for the three objects repeated above 2   Name a pencil 1   Name a watch 1   Have the patient repeat this phrase \"No ifs, ands, or buts\" 1   Three stage command: Take the paper in your right hand 1   Fold the paper in half 1   Put the paper on the floor 1   Read and obey the following: CLOSE YOUR EYES 1   Have the patient write a sentence 1   Have the patient copy a figure 1   Mini Mental Score 26     FALL RISK:   Fall Risk Assessment, last 12 mths 11/18/2019   Able to walk? Yes   Fall in past 12 months? No      ABUSE SCREEN:   Abuse Screening Questionnaire 11/18/2019   Do you ever feel afraid of your partner? N   Are you in a relationship with someone who physically or mentally threatens you? N   Is it safe for you to go home? Y      HEARING SCREEN:The patient wears hearing aids. wears hearing aides  NUTRITION SCREEN: healthy eater  This patient resides at [facility] on the [place] [address]. What are the patient's living arrangements - the patient lives alone. Does the patient use any ambulatory aids: NO    Does the patient exhibit a steady gait? Yes    Is the patient self reliant?  (ie can do own laundry, meals, household chores)  Yes    Does the patient handle his/her own medications? Yes   Does the patient handle his/her own money? yes   Is the patients home safe (ie good lighting, handrails on stairs and bath, etc.)? yes   Did you notice or did patient express any hearing difficulties? yes   Did you notice or did patient express any vision difficulties? yes   Were distance and reading eye charts used? yes     Advance Care Planning & Durable Do Not Resuscitate :    No flowsheet data found. Date of ACP Conversation: 11/18/19  Location of Conversation: In Office Visit 11/18/19. Persons included in Conversation:  patient and family   Length of ACP Conversation in minutes:  16 minutes  Is the patient deemed incompetent to make his/her health decisions: no  Authorized Decision Maker: Healthcare Agent/Medical Power of  under Advance Directive   Conversation: The following was discussed with the Ms. Zheng Whyte with time given to answer questions concerning advance care planning:  Understanding of medical condition    Understanding of CPR, goals and expected outcomes, benefits and burdens discussed. Information on CPR success rates provided (e.g. for CPR in hospital, survival to d/c at two weeks is 22%, for chronically ill or elderly/frail survival is less than 3%); Individual asked to communicate understanding of information in his/her own words. Explored fears and concerns regarding CPR or possible outcomes   Provided ACP educational materials: Understanding Advance Directives by 2025 Flo Whaley (www. [employer].org)    Reviewed existing Advance Directive   Recommended communicating the plan and making copies for the healthcare agent, personal physician, and others as appropriate (e.g., health system)  Recommended review of completed ACP document annually or upon change in health status  Reviewed existing DDNR order on file. Per conversation today with patient there are not changes that need to be made at this time. Order stands.                        Treatment Plan:     Orders Placed This Encounter    BRICE 3D GLEN W MAMMO BI SCREENING INCL CAD    AMYLASE    CBC WITH AUTOMATED DIFF    HEMOGLOBIN A1C WITH EAG    LIPASE    LIPID PANEL    MAGNESIUM    METABOLIC PANEL, COMPREHENSIVE    MICROALBUMIN, UR, RAND W/ MICROALB/CREAT RATIO    NT-PRO BNP    THYROID PANEL W/TSH    UA WITH REFLEX MICRO AND CULTURE    VITAMIN B12 & FOLATE    VITAMIN D, 25 HYDROXY    PHOSPHORUS    CREATINE KINASE (CK), MB/TOTAL    TROPONIN I    DO NOT RESUSCITATE    CT PRESENCE/ABSENCE URINARY INCONTINENCE ASSESSED    CT PT FALLS ASSESS DOC 0-1 FALLS W/OUT INJ PAST YR    CT ANNUAL ALCOHOL SCREEN 15 MIN    CT INTENS BEHAVE THER CARDIO DX    CT DEPRESSION SCREEN ANNUAL    CT  BENEFIT/RISK AREDS FOR MAC DEGENERATION    CT MEDICATION LIST DOCUMENTED IN MEDICAL RECORD    COLLECTION VENOUS BLOOD,VENIPUNCTURE    REMOVE IMPACTED EAR WAX    spironolactone (ALDACTONE) 25 mg tablet    furosemide (LASIX) 40 mg tablet    aspirin delayed-release 81 mg tablet    carbamide peroxide (DEBROX) 6.5 % otic solution The following medication/treatments have been discontinued by the provider today after performing detailed medication reconciliation with patient:   There are no discontinued medications. Disclaimer: This is an [employer] Exam (AWV). Patient verbalized understanding and agreement with the plan. A copy of the After Visit Summary with personalized health plan was given to the patient today. Greater than 30 minutes was spent with patient discussing advance directives, wellness initiatives, and preventative measures of their health. I have reviewed the patient's medical history in detail and updated the computerized patient record.        Maria E Griffin RN, MSN, [facility]  Acute Care/Adult Gerontology Nurse Practitioner   [facility]   [address] [facility]   [place], [address] [place] [address]  [phone] (office)   [phone] (cell)   [phone] (office fax)

## 2019-11-22 LAB
25(OH)D3+25(OH)D2 SERPL-MCNC: 12.4 NG/ML (ref 30–100)
ALBUMIN SERPL-MCNC: 4.2 G/DL (ref 3.2–4.6)
ALBUMIN/CREAT UR: 10.3 MG/G CREAT (ref 0–30)
ALBUMIN/GLOB SERPL: 1.8 {RATIO} (ref 1.2–2.2)
ALP SERPL-CCNC: 84 IU/L (ref 39–117)
ALT SERPL-CCNC: 15 IU/L (ref 0–32)
AMYLASE SERPL-CCNC: 104 U/L (ref 31–124)
APPEARANCE UR: CLEAR
AST SERPL-CCNC: 21 IU/L (ref 0–40)
BACTERIA #/AREA URNS HPF: ABNORMAL /[HPF]
BACTERIA UR CULT: ABNORMAL
BASOPHILS # BLD AUTO: 0.1 X10E3/UL (ref 0–0.2)
BASOPHILS NFR BLD AUTO: 1 %
BILIRUB SERPL-MCNC: 1 MG/DL (ref 0–1.2)
BILIRUB UR QL STRIP: NEGATIVE
BUN SERPL-MCNC: 24 MG/DL (ref 10–36)
BUN/CREAT SERPL: 22 (ref 12–28)
CALCIUM SERPL-MCNC: 8.9 MG/DL (ref 8.7–10.3)
CASTS URNS MICRO: ABNORMAL
CASTS URNS QL MICRO: PRESENT /LPF
CHLORIDE SERPL-SCNC: 106 MMOL/L (ref 96–106)
CHOLEST SERPL-MCNC: 154 MG/DL (ref 100–199)
CK MB SERPL-MCNC: 4.4 NG/ML (ref 0–5.3)
CK SERPL-CCNC: 85 U/L (ref 24–173)
CO2 SERPL-SCNC: 20 MMOL/L (ref 20–29)
COLOR UR: YELLOW
CREAT SERPL-MCNC: 1.1 MG/DL (ref 0.57–1)
CREAT UR-MCNC: 133.1 MG/DL
EOSINOPHIL # BLD AUTO: 0.1 X10E3/UL (ref 0–0.4)
EOSINOPHIL NFR BLD AUTO: 2 %
EPI CELLS #/AREA URNS HPF: ABNORMAL /HPF (ref 0–10)
ERYTHROCYTE [DISTWIDTH] IN BLOOD BY AUTOMATED COUNT: 12.7 % (ref 12.3–15.4)
EST. AVERAGE GLUCOSE BLD GHB EST-MCNC: 103 MG/DL
FOLATE SERPL-MCNC: 12 NG/ML
FT4I SERPL CALC-MCNC: 1.2 (ref 1.2–4.9)
GLOBULIN SER CALC-MCNC: 2.4 G/DL (ref 1.5–4.5)
GLUCOSE SERPL-MCNC: 89 MG/DL (ref 65–99)
GLUCOSE UR QL: NEGATIVE
HBA1C MFR BLD: 5.2 % (ref 4.8–5.6)
HCT VFR BLD AUTO: 33.5 % (ref 34–46.6)
HDLC SERPL-MCNC: 56 MG/DL
HGB BLD-MCNC: 11.5 G/DL (ref 11.1–15.9)
HGB UR QL STRIP: NEGATIVE
IMM GRANULOCYTES # BLD AUTO: 0 X10E3/UL (ref 0–0.1)
IMM GRANULOCYTES NFR BLD AUTO: 0 %
KETONES UR QL STRIP: NEGATIVE
LDLC SERPL CALC-MCNC: 81 MG/DL (ref 0–99)
LEUKOCYTE ESTERASE UR QL STRIP: ABNORMAL
LIPASE SERPL-CCNC: 43 U/L (ref 14–85)
LYMPHOCYTES # BLD AUTO: 0.7 X10E3/UL (ref 0.7–3.1)
LYMPHOCYTES NFR BLD AUTO: 12 %
MAGNESIUM SERPL-MCNC: 2.1 MG/DL (ref 1.6–2.3)
MCH RBC QN AUTO: 32.2 PG (ref 26.6–33)
MCHC RBC AUTO-ENTMCNC: 34.3 G/DL (ref 31.5–35.7)
MCV RBC AUTO: 94 FL (ref 79–97)
MICRO URNS: ABNORMAL
MICROALBUMIN UR-MCNC: 13.7 UG/ML
MONOCYTES # BLD AUTO: 0.7 X10E3/UL (ref 0.1–0.9)
MONOCYTES NFR BLD AUTO: 12 %
MUCOUS THREADS URNS QL MICRO: PRESENT
NEUTROPHILS # BLD AUTO: 4.1 X10E3/UL (ref 1.4–7)
NEUTROPHILS NFR BLD AUTO: 73 %
NITRITE UR QL STRIP: POSITIVE
NT-PROBNP SERPL-MCNC: 1670 PG/ML (ref 0–738)
PH UR STRIP: 5 [PH] (ref 5–7.5)
PHOSPHATE SERPL-MCNC: 4.2 MG/DL (ref 2.5–4.5)
PLATELET # BLD AUTO: 180 X10E3/UL (ref 150–450)
POTASSIUM SERPL-SCNC: 4.1 MMOL/L (ref 3.5–5.2)
PROT SERPL-MCNC: 6.6 G/DL (ref 6–8.5)
PROT UR QL STRIP: NEGATIVE
RBC # BLD AUTO: 3.57 X10E6/UL (ref 3.77–5.28)
RBC #/AREA URNS HPF: ABNORMAL /HPF (ref 0–2)
SODIUM SERPL-SCNC: 142 MMOL/L (ref 134–144)
SP GR UR: 1.02 (ref 1–1.03)
T3RU NFR SERPL: 24 % (ref 24–39)
T4 SERPL-MCNC: 4.8 UG/DL (ref 4.5–12)
TRIGL SERPL-MCNC: 85 MG/DL (ref 0–149)
TROPONIN I SERPL-MCNC: 0.03 NG/ML (ref 0–0.04)
TSH SERPL DL<=0.005 MIU/L-ACNC: 1.97 UIU/ML (ref 0.45–4.5)
URINALYSIS REFLEX, 377202: ABNORMAL
UROBILINOGEN UR STRIP-MCNC: 0.2 MG/DL (ref 0.2–1)
VIT B12 SERPL-MCNC: 214 PG/ML (ref 232–1245)
VLDLC SERPL CALC-MCNC: 17 MG/DL (ref 5–40)
WBC # BLD AUTO: 5.6 X10E3/UL (ref 3.4–10.8)
WBC #/AREA URNS HPF: ABNORMAL /HPF (ref 0–5)

## 2019-11-25 DIAGNOSIS — B96.89 URINARY TRACT INFECTION DUE TO EXTENDED-SPECTRUM BETA LACTAMASE (ESBL)-PRODUCING KLEBSIELLA: ICD-10-CM

## 2019-11-25 DIAGNOSIS — N39.0 URINARY TRACT INFECTION DUE TO EXTENDED-SPECTRUM BETA LACTAMASE (ESBL)-PRODUCING KLEBSIELLA: ICD-10-CM

## 2019-11-25 DIAGNOSIS — M89.9 DISORDER OF BONE, UNSPECIFIED: ICD-10-CM

## 2019-11-25 DIAGNOSIS — E53.8 VITAMIN B12 DEFICIENCY DISEASE: Primary | ICD-10-CM

## 2019-11-25 DIAGNOSIS — Z13.820 SCREENING FOR OSTEOPOROSIS: ICD-10-CM

## 2019-11-25 DIAGNOSIS — R79.89 ELEVATED BRAIN NATRIURETIC PEPTIDE (BNP) LEVEL: ICD-10-CM

## 2019-11-25 DIAGNOSIS — E55.9 VITAMIN D DEFICIENCY: ICD-10-CM

## 2019-11-25 DIAGNOSIS — I50.42 CHRONIC COMBINED SYSTOLIC AND DIASTOLIC CONGESTIVE HEART FAILURE (HCC): ICD-10-CM

## 2019-11-25 RX ORDER — LANOLIN ALCOHOL/MO/W.PET/CERES
500 CREAM (GRAM) TOPICAL DAILY
Qty: 90 TAB | Refills: 1 | Status: SHIPPED | OUTPATIENT
Start: 2019-11-25

## 2019-11-25 RX ORDER — CALCIUM CARB/VITAMIN D3/VIT K1 500-500-40
TABLET,CHEWABLE ORAL
Refills: 4 | Status: ON HOLD | COMMUNITY
Start: 2019-11-18 | End: 2021-06-10

## 2019-11-25 RX ORDER — ACETAMINOPHEN 500 MG
2000 TABLET ORAL DAILY
Qty: 90 CAP | Refills: 1 | Status: SHIPPED | OUTPATIENT
Start: 2019-11-25

## 2019-11-25 RX ORDER — SULFAMETHOXAZOLE AND TRIMETHOPRIM 400; 80 MG/1; MG/1
1 TABLET ORAL 2 TIMES DAILY
Qty: 20 TAB | Refills: 0 | Status: SHIPPED | OUTPATIENT
Start: 2019-11-25 | End: 2019-12-05

## 2019-11-25 RX ORDER — ERGOCALCIFEROL 1.25 MG/1
50000 CAPSULE ORAL
Qty: 12 CAP | Refills: 1 | Status: SHIPPED | OUTPATIENT
Start: 2019-11-25

## 2019-11-25 NOTE — PROGRESS NOTES
Orders Placed This Encounter    DEXA BONE DENSITY STUDY AXIAL     Standing Status:   Future     Standing Expiration Date:   12/25/2019     Order Specific Question:   Reason for Exam     Answer:   Screening for Osteoporosis    VITAMIN D, 25 HYDROXY     Standing Status:   Future     Standing Expiration Date:   1/25/2020    VITAMIN B12     Standing Status:   Future     Standing Expiration Date:   1/25/2020    UA WITH REFLEX MICRO AND CULTURE     Standing Status:   Future     Standing Expiration Date:   12/25/2019    ergocalciferol (ERGOCALCIFEROL) 50,000 unit capsule     Sig: Take 1 Cap by mouth every seven (7) days. Indications: Vitamin D Deficiency (High Dose Therapy)     Dispense:  12 Cap     Refill:  1     Deliver to [employer] cholecalciferol (VITAMIN D3) (2,000 UNITS /50 MCG) cap capsule     Sig: Take 2,000 Units by mouth daily. Indications: low vitamin D levels, Vitamin D Deficiency (High Dose Therapy)     Dispense:  90 Cap     Refill:  1     Deliver to [employer] cyanocobalamin (VITAMIN B12) 500 mcg tablet     Sig: Take 1 Tab by mouth daily. Dispense:  90 Tab     Refill:  1     Deliver to [place]    trimethoprim-sulfamethoxazole (BACTRIM, SEPTRA)  mg per tablet     Sig: Take 1 Tab by mouth two (2) times a day for 10 days.      Dispense:  20 Tab     Refill:  0     Deliver to [facility], pt instructed to stop her SPIRONOLACTONE while taking the ABX

## 2019-11-25 NOTE — ACP (ADVANCE CARE PLANNING)
Advance Care Planning 11/25/2019   Patient's Healthcare Decision Maker is: Named in scanned ACP document   Confirm Advance Directive Yes, on file   Does the patient have other document types Do Not Resuscitate; Power of      Ngozi Presley, daughter lives in [place], [place] phone # [phone]

## 2019-11-25 NOTE — PROGRESS NOTES
Labs reviewed with daughter Shaista Post over the phone 11/25/19 5:59 PM  CBC- stable. CMP- mild elevation in creatine, but she is on lasix 40 mg daily & spironolactone 25 mg daily. Very mild. Lipid - stable. Thyroid- normal.   CK MB & Troponin normal   Amylase/Lipase- normal.   Phosphorus/Magnesium- normal.   Vitamin d- ABNORMAL at 12.4. Will start supplementation. Vitamin B 12- ABNORMAL, low at 214. Will start supplementation orally. Pro-B NP- ABNORMAL, 1670. Daughter will take her to Dr. Duvla [place] with cardiology. Again on 2 diuretics daily. Urine Culture- ABNORMAL, Klebsiella Aerogenes infection, starting treatment w/Bactrim x 10 days due to organism. Next labs in 1 month after starting Vitamin B12 & Vitamin D. Urine 5 days after last ABX to check for resolution of the infection.

## 2019-11-25 NOTE — LETTER
[address] [address] [facility] 
[address] [facility] [address] 
[phone] Elida Hunter 3/2/1928 
[address] [place] [address]  
[phone] [facility] [address] PLAN OF CARE 11/25/19 IMPORTANT INFORMATION : Your prescriptions have been electronically sent to the pharmacy you selected: If you do not receive your medications it is your responsibility to contact the pharmacy directly!!   
Rachel Wheeler [address] [facility], [address] [place] [address]; [address] [facility] [address] Phone: [phone] Fax: [phone] Medication/Treatment: 1. PLEASE STOP YOUR SPIRONOLACTONE WHILE TAKING THE ANTIBIOTIC!!!! THIS IS VERY IMPORTANT!!!!!!! Due to your allergies and the susceptibility of the bacteria in the urine my choices were limited and Bactrim was the only option that was not injectable. You can restart the spironolactone once the antibiotic is completed in 10 days. 2. Start the Vitamin D tablets daily. Only one the Vitamin D capsules (green pill) should be once weekly, other one should be daily. I need to recheck your blood work 4-6 weeks after starting the medications to treat the labs. 3. Start the Vitamin B12 tablets daily. I need to recheck your blood work 4-6 weeks after starting the medications to treat the labs. 4. I have cancelled the Mammogram and ordered the Dexa Scan for the bone density per families request. Mika Stanford will schedule this for you to get done at her availability. 5. Urinary Tract Infection- We are using the 1/2 strength of the bactrim to keep your gastrointestinal health while treating this infection. I will need to recheck a urine in office 5 days after your last antibiotic pill is taken to make sure this infection is gone. You should stop by for the urine to be obtained around December 9, 2019.  
 
6. Elevated Heart Failure lab, Mika Stanford will make an appointment with Dr. Rudy Cehng for her to review this.  I have sent her a copy of the lab report and the plan of care as well so she will have the same information to look at that I do. Please let me know if you have any questions about all of this. cholecalciferol (2,000 UNITS /50 MCG) Cap capsule Commonly known as:  VITAMIN D3 Started by:  Libby Lopez Take 2,000 Units by mouth daily. Indications: low vitamin D levels, Vitamin D Deficiency (High Dose Therapy) 2,000 Units 
  
cyanocobalamin 500 mcg tablet Commonly known as:  VITAMIN B12 Take 1 Tab by mouth daily. 500 mcg 
  
ergocalciferol 50,000 unit capsule Commonly known as:  ERGOCALCIFEROL Take 1 Cap by mouth every seven (7) days. Indications: Vitamin D Deficiency (High Dose Therapy) 50,000 Units 
  
trimethoprim-sulfamethoxazole  mg per tablet Commonly known as:  Amanda Silva Take 1 Tab by mouth two (2) times a day for 10 days. 1 Tab Where to Get Your Medications These medications were sent to [address], [address] [place] AT [address] [address] Phone:  [phone] · cholecalciferol (2,000 UNITS /50 MCG) Cap capsule · cyanocobalamin 500 mcg tablet · ergocalciferol 50,000 unit capsule · trimethoprim-sulfamethoxazole  mg per tablet Any questions please call the office or Laurent Merritt at anytime.

## 2019-12-09 DIAGNOSIS — N39.0 URINARY TRACT INFECTION DUE TO EXTENDED-SPECTRUM BETA LACTAMASE (ESBL)-PRODUCING KLEBSIELLA: ICD-10-CM

## 2019-12-09 DIAGNOSIS — B96.89 URINARY TRACT INFECTION DUE TO EXTENDED-SPECTRUM BETA LACTAMASE (ESBL)-PRODUCING KLEBSIELLA: ICD-10-CM

## 2019-12-11 ENCOUNTER — CLINICAL SUPPORT (OUTPATIENT)
Dept: GERIATRIC MEDICINE | Age: 84
End: 2019-12-11

## 2019-12-11 DIAGNOSIS — N39.0 URINARY TRACT INFECTION WITHOUT HEMATURIA, SITE UNSPECIFIED: Primary | ICD-10-CM

## 2019-12-11 NOTE — PROGRESS NOTES
Chief Complaint   Patient presents with   [facility]     Patient here for follow up urine culture

## 2019-12-12 LAB
APPEARANCE UR: CLEAR
BACTERIA #/AREA URNS HPF: ABNORMAL /[HPF]
BILIRUB UR QL STRIP: NEGATIVE
CASTS URNS MICRO: ABNORMAL
CASTS URNS QL MICRO: PRESENT /LPF
COLOR UR: YELLOW
EPI CELLS #/AREA URNS HPF: ABNORMAL /HPF (ref 0–10)
GLUCOSE UR QL: NEGATIVE
HGB UR QL STRIP: ABNORMAL
KETONES UR QL STRIP: NEGATIVE
LEUKOCYTE ESTERASE UR QL STRIP: NEGATIVE
MICRO URNS: ABNORMAL
MUCOUS THREADS URNS QL MICRO: PRESENT
NITRITE UR QL STRIP: NEGATIVE
PH UR STRIP: 5 [PH] (ref 5–7.5)
PROT UR QL STRIP: NEGATIVE
RBC #/AREA URNS HPF: ABNORMAL /HPF (ref 0–2)
SP GR UR: 1.02 (ref 1–1.03)
URINALYSIS REFLEX, 377202: ABNORMAL
UROBILINOGEN UR STRIP-MCNC: 0.2 MG/DL (ref 0.2–1)
WBC #/AREA URNS HPF: ABNORMAL /HPF (ref 0–5)

## 2019-12-16 ENCOUNTER — TELEPHONE (OUTPATIENT)
Dept: GERIATRIC MEDICINE | Age: 84
End: 2019-12-16

## 2020-01-06 DIAGNOSIS — E53.8 VITAMIN B12 DEFICIENCY DISEASE: ICD-10-CM

## 2020-01-06 DIAGNOSIS — E55.9 VITAMIN D DEFICIENCY: ICD-10-CM

## 2020-01-31 ENCOUNTER — TELEPHONE (OUTPATIENT)
Dept: GERIATRIC MEDICINE | Age: 85
End: 2020-01-31

## 2020-01-31 NOTE — TELEPHONE ENCOUNTER
Patients daughter called to scheuled her mother an appt for Monday feb 3 rd. She states that her mother had 1 episode of confusion( Pt woke up in the middle of the night and thought it was day time and attempted to find the shuttle bus) She is  Concerned her mother may have a UTI. She states her mother has not voiced any other signs or symptoms.  I have scheduled patient for Monday Feb # rd at 1 pm.

## 2020-02-03 ENCOUNTER — OFFICE VISIT (OUTPATIENT)
Dept: GERIATRIC MEDICINE | Age: 85
End: 2020-02-03

## 2020-02-03 VITALS
TEMPERATURE: 97.7 F | RESPIRATION RATE: 18 BRPM | BODY MASS INDEX: 23.95 KG/M2 | WEIGHT: 111 LBS | SYSTOLIC BLOOD PRESSURE: 110 MMHG | OXYGEN SATURATION: 97 % | DIASTOLIC BLOOD PRESSURE: 62 MMHG | HEART RATE: 67 BPM | HEIGHT: 57 IN

## 2020-02-03 DIAGNOSIS — I42.8 NON-ISCHEMIC CARDIOMYOPATHY (HCC): ICD-10-CM

## 2020-02-03 DIAGNOSIS — E55.9 VITAMIN D DEFICIENCY: ICD-10-CM

## 2020-02-03 DIAGNOSIS — Z51.81 MEDICATION MONITORING ENCOUNTER: ICD-10-CM

## 2020-02-03 DIAGNOSIS — I48.91 ATRIAL FIBRILLATION, UNSPECIFIED TYPE (HCC): ICD-10-CM

## 2020-02-03 DIAGNOSIS — R41.0 CONFUSION: Primary | ICD-10-CM

## 2020-02-03 DIAGNOSIS — G31.84 MILD COGNITIVE IMPAIRMENT WITH MEMORY LOSS: ICD-10-CM

## 2020-02-03 LAB
BILIRUB UR QL STRIP: NEGATIVE
GLUCOSE UR-MCNC: NEGATIVE MG/DL
KETONES P FAST UR STRIP-MCNC: NEGATIVE MG/DL
PH UR STRIP: 6 [PH] (ref 4.6–8)
PROT UR QL STRIP: NORMAL
SP GR UR STRIP: 1.02 (ref 1–1.03)
UA UROBILINOGEN AMB POC: NORMAL (ref 0.2–1)
URINALYSIS CLARITY POC: CLEAR
URINALYSIS COLOR POC: NORMAL
URINE BLOOD POC: NEGATIVE
URINE LEUKOCYTES POC: NEGATIVE
URINE NITRITES POC: NEGATIVE

## 2020-02-03 RX ORDER — CARVEDILOL 6.25 MG/1
6.25 TABLET ORAL 2 TIMES DAILY
Refills: 2 | Status: ON HOLD | COMMUNITY
Start: 2019-11-23 | End: 2021-06-12 | Stop reason: SDUPTHER

## 2020-02-03 NOTE — PROGRESS NOTES
Reason for Visit   Katheryn Rodriguez is a 80 y.o. female patient who presents today for : pt had a period of confusion last Thursday. She was preoccupied with waking up early so not to miss her hair appointment on Friday. She normally goes to sleep late ~ 12 am.  She is unsure how late she went to sleep that night. She did not recently change any of her medications. There were no prodromal sx's. She was found by staff walking around the buildings of [facility] looking for the shuttle bus to take to her hair appointment around 1 am.  She went back to her apartment without incident. Nursing supervisor alerted the pt's family and advised them that she may have a UTI and to have her urine checked. Family reports they have not seen any confusion over the last year. However, per the daughter, the supervisor advised her that other residents found her confused at times. Pt does not have any urinary sx's, nor systemic signs/sx's. Pt and daughter that is present do not think she has a UTI either. Pt's other daughter would like a UA completed. UA poc is negative. Chief Complaint   Patient presents with    Medication Evaluation     Pt and daughter want to discuss if there is a need for recheck of labs for Vit D and Vit B12.  Altered mental status     Patient had an episode of confusion when she woke up in the night thinking it was day time, Daughter with pt today and ask about check urine culture.       Past Medical History     Past Medical History:   Diagnosis Date    CAD (coronary artery disease)     Cardiomyopathy ([facility] [address]     Congestive heart failure (HCC)     HTN (hypertension)     Hyperlipemia     Mitral regurgitation     Mitral valvular regurgitation     Nonrheumatic mitral (valve) insufficiency     Osteoporosis     Paroxysmal atrial fibrillation (HCC)     Stasis, venous     Tricuspid regurgitation     Vitamin D deficiency      Past Surgical History:   Procedure Laterality Date    HX APPENDECTOMY  HX COLONOSCOPY  06/2004    HX TONSIL AND ADENOIDECTOMY      HX TUBAL LIGATION        Social History     Tobacco Use    Smoking status: Never Smoker    Smokeless tobacco: Never Used   Substance Use Topics    Alcohol use: Never     Frequency: Never    Drug use: Never      Family History   Problem Relation Age of Onset    Heart Failure Mother     Heart Failure Father     Thyroid Cancer Sister     Breast Cancer Other       Prior to Admission medications    Medication Sig Start Date End Date Taking? Authorizing Provider   ergocalciferol (ERGOCALCIFEROL) 50,000 unit capsule Take 1 Cap by mouth every seven (7) days. Indications: Vitamin D Deficiency (High Dose Therapy) 11/25/19  Yes Tricia Masterson NP   cholecalciferol (VITAMIN D3) (2,000 UNITS /50 MCG) cap capsule Take 2,000 Units by mouth daily. Indications: low vitamin D levels, Vitamin D Deficiency (High Dose Therapy) 11/25/19  Yes Tricia Masterson NP   cyanocobalamin (VITAMIN B12) 500 mcg tablet Take 1 Tab by mouth daily. 11/25/19  Yes Tricia Masterson NP   Calcium-Vitamin D3-Vitamin K 500-500-40 mg-unit-mcg chew CHEW AND SWALLOW 1 CHEW PO QD 11/18/19  Yes Provider, Historical   spironolactone (ALDACTONE) 25 mg tablet Take 25 mg by mouth daily. 9/30/19  Yes Provider, Historical   furosemide (LASIX) 40 mg tablet Take 40 mg by mouth daily. 9/30/19  Yes Provider, Historical   aspirin delayed-release 81 mg tablet Take  by mouth daily. Yes Provider, Historical   calcium-vitamin D3-vitamin K (VIACTIV) 650 mg-12.5 mcg-40 mcg chew Take 1 Piece by mouth daily. Indications: osteoporosis, a condition of weak bones 11/18/19  Yes Tricia Masterson NP   carbamide peroxide (DEBROX) 6.5 % otic solution Place 5 drops in both ear canals once weekly before bed to prevent cerumen production.  11/18/19   Tricia Masterson NP     Allergies   Allergen Reactions    Evista [Raloxifene] Other (comments)    Levaquin [Levofloxacin] Unknown (comments)    Pcn [Penicillins] Unknown (comments)    Xarelto [Rivaroxaban] Rash       Functional Assessment:   ADL FUNCTIONALITY:  ADL Assessment 11/18/2019   Feeding yourself No Help Needed   Getting from bed to chair No Help Needed   Getting dressed No Help Needed   Bathing or showering No Help Needed   Walk across the room (includes cane/walker) No Help Needed   Using the telphone No Help Needed   Taking your medications No Help Needed   Preparing meals No Help Needed   Managing money (expenses/bills) No Help Needed   Moderately strenuous housework (laundry) No Help Needed   Shopping for personal items (toiletries/medicines) No Help Needed   Shopping for groceries Help Needed   Driving Help Needed   Climbing a flight of stairs No Help Needed   Getting to places beyond walking distances No Help Needed     DEPRESSION SCREENING:   3 most recent PHQ Screens 2/3/2020   Little interest or pleasure in doing things Not at all   Feeling down, depressed, irritable, or hopeless Not at all   Total Score PHQ 2 0   Trouble falling or staying asleep, or sleeping too much -   Feeling tired or having little energy -   Poor appetite, weight loss, or overeating -   Feeling bad about yourself - or that you are a failure or have let yourself or your family down -   Trouble concentrating on things such as school, work, reading, or watching TV -   Moving or speaking so slowly that other people could have noticed; or the opposite being so fidgety that others notice -   Thoughts of being better off dead, or hurting yourself in some way -   PHQ 9 Score -      Mini Mental State Exam 11/18/2019   What is the Year 1   What is the Season 1   What is the Date 1   What is the Day 1   What is the Month 1   Where are we State 1   Where are we Country 1   Where are we [place] or [place] 1   Where are we Floor 1   Name three objects, then ask the patient to say them 3   Serial sevens Subtract 7 from 100 in increments 2   Ask for the three objects repeated above 2   Name a pencil 1   Name a watch 1   Have the patient repeat this phrase \"No ifs, ands, or buts\" 1   Three stage command: Take the paper in your right hand 1   Fold the paper in half 1   Put the paper on the floor 1   Read and obey the following: CLOSE YOUR EYES 1   Have the patient write a sentence 1   Have the patient copy a figure 1   Mini Mental Score 26     FALL RISK:   Fall Risk Assessment, last 12 mths 2/3/2020   Able to walk? Yes   Fall in past 12 months? No      ABUSE SCREEN:   Abuse Screening Questionnaire 11/18/2019   Do you ever feel afraid of your partner? N   Are you in a relationship with someone who physically or mentally threatens you? N   Is it safe for you to go home? Y      Advance Care Planning 11/25/2019   Patient's Healthcare Decision Maker is: Named in scanned ACP document   Confirm Advance Directive Yes, on file   Does the patient have other document types Do Not Resuscitate; Power of      CHANGES IN TREATMENT:    The following treatment modalities have been discontinued by the provider today:   There are no discontinued medications. MOST RECENT LABORATORY RESULTS:      Clinical Support on 12/11/2019   Component Date Value Ref Range Status    Specific Gravity 12/11/2019 1.020  1.005 - 1.030 Final    pH (UA) 12/11/2019 5.0  5.0 - 7.5 Final    Color 12/11/2019 Yellow  Yellow Final    Appearance 12/11/2019 Clear  Clear Final    Leukocyte Esterase 12/11/2019 Negative  Negative Final    Protein 12/11/2019 Negative  Negative/Trace Final    Glucose 12/11/2019 Negative  Negative Final    Ketone 12/11/2019 Negative  Negative Final    Blood 12/11/2019 Trace* Negative Final    Bilirubin 12/11/2019 Negative  Negative Final    Urobilinogen 12/11/2019 0.2  0.2 - 1.0 mg/dL Final    Nitrites 12/11/2019 Negative  Negative Final    Microscopic Examination 12/11/2019 See additional order   Final    Microscopic was indicated and was performed.     URINALYSIS REFLEX 12/11/2019 Comment   Final This specimen will not reflex to a Urine Culture.  WBC 12/11/2019 0-5  0 - 5 /hpf Final    RBC 12/11/2019 0-2  0 - 2 /hpf Final    Epithelial cells 12/11/2019 0-10  0 - 10 /hpf Final    Casts 12/11/2019 Present* None seen /lpf Final    Cast type 12/11/2019 Hyaline casts  N/A Final    Mucus 12/11/2019 Present  Not Estab. Final    Bacteria 12/11/2019 None seen  None seen/Few Final   Office Visit on 11/18/2019   Component Date Value Ref Range Status    Amylase 11/18/2019 104  31 - 124 U/L Final    WBC 11/18/2019 5.6  3.4 - 10.8 x10E3/uL Final    RBC 11/18/2019 3.57* 3.77 - 5.28 x10E6/uL Final    HGB 11/18/2019 11.5  11.1 - 15.9 g/dL Final    HCT 11/18/2019 33.5* 34.0 - 46.6 % Final    MCV 11/18/2019 94  79 - 97 fL Final    MCH 11/18/2019 32.2  26.6 - 33.0 pg Final    MCHC 11/18/2019 34.3  31.5 - 35.7 g/dL Final    RDW 11/18/2019 12.7  12.3 - 15.4 % Final    PLATELET 41/05/1280 421  150 - 450 x10E3/uL Final    NEUTROPHILS 11/18/2019 73  Not Estab. % Final    Lymphocytes 11/18/2019 12  Not Estab. % Final    MONOCYTES 11/18/2019 12  Not Estab. % Final    EOSINOPHILS 11/18/2019 2  Not Estab. % Final    BASOPHILS 11/18/2019 1  Not Estab. % Final    ABS. NEUTROPHILS 11/18/2019 4.1  1.4 - 7.0 x10E3/uL Final    Abs Lymphocytes 11/18/2019 0.7  0.7 - 3.1 x10E3/uL Final    ABS. MONOCYTES 11/18/2019 0.7  0.1 - 0.9 x10E3/uL Final    ABS. EOSINOPHILS 11/18/2019 0.1  0.0 - 0.4 x10E3/uL Final    ABS. BASOPHILS 11/18/2019 0.1  0.0 - 0.2 x10E3/uL Final    IMMATURE GRANULOCYTES 11/18/2019 0  Not Estab. % Final    ABS. IMM. GRANS.  11/18/2019 0.0  0.0 - 0.1 x10E3/uL Final    Hemoglobin A1c 11/18/2019 5.2  4.8 - 5.6 % Final    Comment:          Prediabetes: 5.7 - 6.4           Diabetes: >6.4           Glycemic control for adults with diabetes: <7.0      Estimated average glucose 11/18/2019 103  mg/dL Final    Lipase 11/18/2019 43  14 - 85 U/L Final    Cholesterol, total 11/18/2019 154  100 - 199 mg/dL Final    Triglyceride 11/18/2019 85  0 - 149 mg/dL Final    HDL Cholesterol 11/18/2019 56  >39 mg/dL Final    VLDL, calculated 11/18/2019 17  5 - 40 mg/dL Final    LDL, calculated 11/18/2019 81  0 - 99 mg/dL Final    Magnesium 11/18/2019 2.1  1.6 - 2.3 mg/dL Final    Glucose 11/18/2019 89  65 - 99 mg/dL Final    BUN 11/18/2019 24  10 - 36 mg/dL Final    Creatinine 11/18/2019 1.10* 0.57 - 1.00 mg/dL Final    GFR est non-AA 11/18/2019 44* >59 mL/min/1.73 Final    GFR est AA 11/18/2019 51* >59 mL/min/1.73 Final    BUN/Creatinine ratio 11/18/2019 22  12 - 28 Final    Sodium 11/18/2019 142  134 - 144 mmol/L Final    Potassium 11/18/2019 4.1  3.5 - 5.2 mmol/L Final    Chloride 11/18/2019 106  96 - 106 mmol/L Final    CO2 11/18/2019 20  20 - 29 mmol/L Final    Calcium 11/18/2019 8.9  8.7 - 10.3 mg/dL Final    Protein, total 11/18/2019 6.6  6.0 - 8.5 g/dL Final    Albumin 11/18/2019 4.2  3.2 - 4.6 g/dL Final    GLOBULIN, TOTAL 11/18/2019 2.4  1.5 - 4.5 g/dL Final    A-G Ratio 11/18/2019 1.8  1.2 - 2.2 Final    Bilirubin, total 11/18/2019 1.0  0.0 - 1.2 mg/dL Final    Alk.  phosphatase 11/18/2019 84  39 - 117 IU/L Final    AST (SGOT) 11/18/2019 21  0 - 40 IU/L Final    ALT (SGPT) 11/18/2019 15  0 - 32 IU/L Final    Creatinine, urine 11/18/2019 133.1  Not Estab. mg/dL Final    Microalbumin, urine 11/18/2019 13.7  Not Estab. ug/mL Final    Microalb/Creat ratio (ug/mg creat.) 11/18/2019 10.3  0.0 - 30.0 mg/g creat Final    Comment:                      Normal:                0.0 -  30.0                       Albuminuria:          31.0 - 300.0                       Clinical albuminuria:       >300.0      PROBNP 11/18/2019 1,670* 0 - 738 pg/mL Final    Comment: The following cut-points have been suggested for the  use of proBNP for the diagnostic evaluation of heart  failure (HF) in patients with acute dyspnea:  Modality                     Age           Optimal Cut                             (years) Point  ------------------------------------------------------  Diagnosis (rule in HF)        <50            450 pg/mL                            50 - 75            900 pg/mL                                >75           1800 pg/mL  Exclusion (rule out HF)  Age independent     300 pg/mL      TSH 11/18/2019 1.970  0.450 - 4.500 uIU/mL Final    T4, Total 11/18/2019 4.8  4.5 - 12.0 ug/dL Final    T3 Uptake 11/18/2019 24  24 - 39 % Final    Free Thyroxine Index (FTI) 11/18/2019 1.2  1.2 - 4.9 Final    Specific Gravity 11/18/2019 1.023  1.005 - 1.030 Final    pH (UA) 11/18/2019 5.0  5.0 - 7.5 Final    Color 11/18/2019 Yellow  Yellow Final    Appearance 11/18/2019 Clear  Clear Final    Leukocyte Esterase 11/18/2019 1+* Negative Final    Protein 11/18/2019 Negative  Negative/Trace Final    Glucose 11/18/2019 Negative  Negative Final    Ketone 11/18/2019 Negative  Negative Final    Blood 11/18/2019 Negative  Negative Final    Bilirubin 11/18/2019 Negative  Negative Final    Urobilinogen 11/18/2019 0.2  0.2 - 1.0 mg/dL Final    Nitrites 11/18/2019 Positive* Negative Final    Microscopic Examination 11/18/2019 See additional order   Final    Microscopic was indicated and was performed.  URINALYSIS REFLEX 11/18/2019 Comment   Final    This specimen has reflexed to a Urine Culture.  Vitamin B12 11/18/2019 214* 232 - 1,245 pg/mL Final    Folate 11/18/2019 12.0  >3.0 ng/mL Final    Comment: A serum folate concentration of less than 3.1 ng/mL is  considered to represent clinical deficiency.  VITAMIN D, 25-HYDROXY 11/18/2019 12.4* 30.0 - 100.0 ng/mL Final    Comment: Vitamin D deficiency has been defined by the 2599 [facility] practice guideline as a  level of serum 25-OH vitamin D less than 20 ng/mL (1,2). The Endocrine Society went on to further define vitamin D  insufficiency as a level between 21 and 29 ng/mL (2). 1. IOM ([employer]). 2010.  Dietary reference     intakes for calcium and D. [facility]: The     [place]. 2. Jyoti MF, Jerry NC, Bertha DOLAN, et al.     Evaluation, treatment, and prevention of vitamin D     deficiency: an Endocrine Society clinical practice     guideline. JCEM. 2011 Jul; 96(2):5621-30.  Phosphorus 11/18/2019 4.2  2.5 - 4.5 mg/dL Final    Creatine Kinase,Total 11/18/2019 85  24 - 173 U/L Final    Creatine Kinase (CK), MB 11/18/2019 4.4  0.0 - 5.3 ng/mL Final    Troponin-I, Qt. 11/18/2019 0.03  0.00 - 0.04 ng/mL Final    WBC 11/18/2019 6-10* 0 - 5 /hpf Final    RBC 11/18/2019 0-2  0 - 2 /hpf Final    Epithelial cells 11/18/2019 0-10  0 - 10 /hpf Final    Casts 11/18/2019 Present* None seen /lpf Final    Cast type 11/18/2019 Hyaline casts  N/A Final    Mucus 11/18/2019 Present  Not Estab. Final    Bacteria 11/18/2019 Many* None seen/Few Final    Urine Culture, Routine 11/18/2019 *  Final                    Value:Klebsiella aerogenes  50,000-100,000 colony forming units per mL      formerly Enterobacter aerogenes     Results for orders placed or performed in visit on 12/11/19   UA WITH REFLEX MICRO AND CULTURE   Result Value Ref Range    Specific Gravity 1.020 1.005 - 1.030    pH (UA) 5.0 5.0 - 7.5    Color Yellow Yellow    Appearance Clear Clear    Leukocyte Esterase Negative Negative    Protein Negative Negative/Trace    Glucose Negative Negative    Ketone Negative Negative    Blood Trace (A) Negative    Bilirubin Negative Negative    Urobilinogen 0.2 0.2 - 1.0 mg/dL    Nitrites Negative Negative    Microscopic Examination See additional order     URINALYSIS REFLEX Comment    MICROSCOPIC EXAMINATION   Result Value Ref Range    WBC 0-5 0 - 5 /hpf    RBC 0-2 0 - 2 /hpf    Epithelial cells 0-10 0 - 10 /hpf    Casts Present (A) None seen /lpf    Cast type Hyaline casts N/A    Mucus Present Not Estab.     Bacteria None seen None seen/Few      XR Results (most recent):  No results found for this or any previous visit. Objective:     Vitals:    02/03/20 1314   BP: 110/62   Pulse: 67   Resp: 18   Temp: 97.7 °F (36.5 °C)   TempSrc: Oral   SpO2: 97%   Weight: 111 lb (50.3 kg)   Height: 4' 9.09\" (1.45 m)     Wt Readings from Last 3 Encounters:   02/03/20 111 lb (50.3 kg)   11/18/19 107 lb 6.4 oz (48.7 kg)     BP Readings from Last 3 Encounters:   02/03/20 110/62   11/18/19 108/62     Review of Systems   Constitutional: Negative for appetite change, chills, diaphoresis, fatigue, fever and unexpected weight change. HENT: Negative for congestion, dental problem, drooling, ear discharge, ear pain, facial swelling, hearing loss, mouth sores, nosebleeds, postnasal drip, rhinorrhea, sinus pressure, sinus pain, sneezing, sore throat, tinnitus, trouble swallowing and voice change. Respiratory: Positive for shortness of breath. Negative for apnea, cough, choking, chest tightness, wheezing and stridor. GRIDER   Cardiovascular: Negative for chest pain, palpitations and leg swelling. Gastrointestinal: Negative for abdominal distention, abdominal pain, anal bleeding, blood in stool, constipation, diarrhea, nausea, rectal pain and vomiting. Endocrine: Negative. Negative for cold intolerance, heat intolerance, polydipsia and polyphagia. Genitourinary: Positive for urgency. Negative for decreased urine volume, difficulty urinating, dyspareunia, dysuria, enuresis, flank pain, frequency, genital sores, hematuria, menstrual problem, pelvic pain, vaginal bleeding, vaginal discharge and vaginal pain. Musculoskeletal: Negative. Skin: Negative. Neurological: Negative for dizziness, tremors, seizures, syncope, speech difficulty, weakness, light-headedness, numbness and headaches. Hematological: Negative for adenopathy. Bruises/bleeds easily. Psychiatric/Behavioral: Negative. Physical Exam  Constitutional:       Appearance: Normal appearance. HENT:      Head: Normocephalic and atraumatic.       Nose: Nose normal.      Mouth/Throat:      Mouth: Mucous membranes are moist.      Pharynx: Oropharynx is clear. Eyes:      Extraocular Movements: Extraocular movements intact. Pupils: Pupils are equal, round, and reactive to light. Neck:      Musculoskeletal: Normal range of motion. Cardiovascular:      Rate and Rhythm: Normal rate. Rhythm irregular. Pulses: Normal pulses. Heart sounds: Murmur present. Comments: + TONY  Pulmonary:      Effort: Pulmonary effort is normal.      Breath sounds: Normal breath sounds. Abdominal:      General: Bowel sounds are normal.      Palpations: Abdomen is soft. Musculoskeletal:      Right lower leg: Edema present. Left lower leg: Edema present. Skin:     General: Skin is warm and dry. Capillary Refill: Capillary refill takes less than 2 seconds. Neurological:      General: No focal deficit present. Mental Status: She is alert and oriented to person, place, and time. Psychiatric:         Mood and Affect: Mood normal.         Behavior: Behavior normal.        Diagnosis:        ICD-10-CM ICD-9-CM    1. Confusion R41.0 298.9    2. Medication monitoring encounter Z51.81 V58.83       1. Confusion: first incident reported. Will check labs and look for any abnormalities. NOT confused on exam, although she is forgetful. 2. Mild Cognitive Impairment: Daughters very supportive. [facility] [place] [facility] [facility] Staff to continue with supportive care. 3. 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 Bess Cruz. C/w furosemide. Not on potassium supplementation. Will evaluate her levels and add in if necessary. 4. Severe MR, Moderate TR: follows with Dr. Kumar Hernandez    5. Hx of Afib: on baby asa. Previously on [address] [place] [address], however this was stopped 2/2 hgb of 6. Previously recommended ICD, however, she declined.  C/w BB      Disclaimer:   Ms. Kameron Davis has been advised to call or return to our office if symptoms worsen/change/persist. We as a care team including the patient; discussed expected course/resolution/complications of diagnosis in detail today. Medication risks/benefits/costs/interactions/alternatives discussed Myke Tom. Wornom was given an after visit summary which includes diagnoses, current medications, & vitals. Weston Acevedo expressed understanding with the diagnosis and plan.

## 2020-02-03 NOTE — PROGRESS NOTES
ADVISED PATIENT OF THE FOLLOWING HEALTH MAINTAINCE DUE  Health Maintenance Due   Topic Date Due    DTaP/Tdap/Td series (1 - Tdap) 03/02/1939    GLAUCOMA SCREENING Q2Y  03/02/1993    Pneumococcal 65+ years (1 of 1 - PPSV23) 03/02/1993      Chief Complaint   Patient presents with    Medication Evaluation     Pt and daughter want to discuss if there is a need for recheck of labs for Vit D and Vit B12.  Altered mental status     Patient had an episode of confusion when she woke up in the night thinking it was day time, Daughter with pt today and ask about check urine culture. 1. Have you been to the ER, urgent care clinic since your last visit? Hospitalized since your last visit? No    2. Have you seen or consulted any other health care providers outside of the [address] [place] [address] since your last visit? Include any DEXA scan, mammography  or colon screening. Yes Cardiology and note has been scanned into media    3. Do you have an Advance Directive on file? yes    4. Do you have a DNR on file? DNR    Patient is accompanied by self and daughter I have received verbal consent from Anthony Mendoza to discuss any/all medical information while they are present in the room. Advance Care Planning 11/25/2019   Patient's Healthcare Decision Maker is: Named in scanned ACP document   Confirm Advance Directive Yes, on file   Does the patient have other document types Do Not Resuscitate; [id number] - [facility], [address] AT [facility]  [address] [address]  Phone: [phone] Fax: [phone]        Patient reminded during visit to bring all medication bottles, OTC medications to all appointments.      Anthony Mendoza presents for lab draw ordered by Lilly Conroy RN MSN ACNPC-AG-NP    The following labs were drawn and sent to lab by Ranjan Rojas LPN:    CBC, BMP, TSH, 3rd Generation and Vit B12 folate, Vit D and Magnesium    The following tubes were sent:    Lavender  ( 1) and Tiger(1)    Patient tolerated procedure well, blood obtained via venipuncture to left antecubital

## 2020-02-04 LAB
25(OH)D3+25(OH)D2 SERPL-MCNC: 62 NG/ML (ref 30–100)
BASOPHILS # BLD AUTO: 0.1 X10E3/UL (ref 0–0.2)
BASOPHILS NFR BLD AUTO: 1 %
BUN SERPL-MCNC: 29 MG/DL (ref 10–36)
BUN/CREAT SERPL: 25 (ref 12–28)
CALCIUM SERPL-MCNC: 9.1 MG/DL (ref 8.7–10.3)
CHLORIDE SERPL-SCNC: 100 MMOL/L (ref 96–106)
CO2 SERPL-SCNC: 21 MMOL/L (ref 20–29)
CREAT SERPL-MCNC: 1.17 MG/DL (ref 0.57–1)
EOSINOPHIL # BLD AUTO: 0.2 X10E3/UL (ref 0–0.4)
EOSINOPHIL NFR BLD AUTO: 3 %
ERYTHROCYTE [DISTWIDTH] IN BLOOD BY AUTOMATED COUNT: 12.7 % (ref 11.7–15.4)
FOLATE SERPL-MCNC: 10.1 NG/ML
GLUCOSE SERPL-MCNC: 80 MG/DL (ref 65–99)
HCT VFR BLD AUTO: 33.6 % (ref 34–46.6)
HGB BLD-MCNC: 11.4 G/DL (ref 11.1–15.9)
IMM GRANULOCYTES # BLD AUTO: 0 X10E3/UL (ref 0–0.1)
IMM GRANULOCYTES NFR BLD AUTO: 0 %
LYMPHOCYTES # BLD AUTO: 0.8 X10E3/UL (ref 0.7–3.1)
LYMPHOCYTES NFR BLD AUTO: 14 %
MAGNESIUM SERPL-MCNC: 2 MG/DL (ref 1.6–2.3)
MCH RBC QN AUTO: 32.5 PG (ref 26.6–33)
MCHC RBC AUTO-ENTMCNC: 33.9 G/DL (ref 31.5–35.7)
MCV RBC AUTO: 96 FL (ref 79–97)
MONOCYTES # BLD AUTO: 0.6 X10E3/UL (ref 0.1–0.9)
MONOCYTES NFR BLD AUTO: 11 %
NEUTROPHILS # BLD AUTO: 4.1 X10E3/UL (ref 1.4–7)
NEUTROPHILS NFR BLD AUTO: 71 %
PLATELET # BLD AUTO: 199 X10E3/UL (ref 150–450)
POTASSIUM SERPL-SCNC: 4.4 MMOL/L (ref 3.5–5.2)
RBC # BLD AUTO: 3.51 X10E6/UL (ref 3.77–5.28)
SODIUM SERPL-SCNC: 141 MMOL/L (ref 134–144)
TSH SERPL DL<=0.005 MIU/L-ACNC: 2.61 UIU/ML (ref 0.45–4.5)
VIT B12 SERPL-MCNC: 453 PG/ML (ref 232–1245)
WBC # BLD AUTO: 5.8 X10E3/UL (ref 3.4–10.8)

## 2020-09-30 LAB — HBA1C MFR BLD HPLC: 5.2 %

## 2021-06-09 ENCOUNTER — HOSPITAL ENCOUNTER (INPATIENT)
Age: 86
LOS: 3 days | Discharge: HOME | DRG: 292 | End: 2021-06-12
Attending: EMERGENCY MEDICINE | Admitting: INTERNAL MEDICINE
Payer: MEDICARE

## 2021-06-09 ENCOUNTER — TELEPHONE (OUTPATIENT)
Dept: CARDIOLOGY CLINIC | Age: 86
End: 2021-06-09

## 2021-06-09 ENCOUNTER — APPOINTMENT (OUTPATIENT)
Dept: GENERAL RADIOLOGY | Age: 86
DRG: 292 | End: 2021-06-09
Attending: EMERGENCY MEDICINE
Payer: MEDICARE

## 2021-06-09 DIAGNOSIS — I50.9 ACUTE ON CHRONIC CONGESTIVE HEART FAILURE, UNSPECIFIED HEART FAILURE TYPE (HCC): ICD-10-CM

## 2021-06-09 DIAGNOSIS — R06.00 DYSPNEA, UNSPECIFIED TYPE: Primary | ICD-10-CM

## 2021-06-09 DIAGNOSIS — J81.0 ACUTE PULMONARY EDEMA (HCC): ICD-10-CM

## 2021-06-09 DIAGNOSIS — I48.11 LONGSTANDING PERSISTENT ATRIAL FIBRILLATION (HCC): ICD-10-CM

## 2021-06-09 DIAGNOSIS — I34.0 MITRAL VALVE INSUFFICIENCY, UNSPECIFIED ETIOLOGY: ICD-10-CM

## 2021-06-09 LAB
ALBUMIN SERPL-MCNC: 3.7 G/DL (ref 3.5–5)
ALBUMIN/GLOB SERPL: 0.9 {RATIO} (ref 1.1–2.2)
ALP SERPL-CCNC: 114 U/L (ref 45–117)
ALT SERPL-CCNC: 25 U/L (ref 12–78)
ANION GAP SERPL CALC-SCNC: 13 MMOL/L (ref 5–15)
AST SERPL-CCNC: 22 U/L (ref 15–37)
BASOPHILS # BLD: 0 K/UL (ref 0–0.1)
BASOPHILS NFR BLD: 0 % (ref 0–1)
BILIRUB SERPL-MCNC: 1.9 MG/DL (ref 0.2–1)
BNP SERPL-MCNC: 6123 PG/ML (ref 0–450)
BUN SERPL-MCNC: 15 MG/DL (ref 6–20)
BUN/CREAT SERPL: 13 (ref 12–20)
CALCIUM SERPL-MCNC: 9.4 MG/DL (ref 8.5–10.1)
CHLORIDE SERPL-SCNC: 101 MMOL/L (ref 97–108)
CO2 SERPL-SCNC: 27 MMOL/L (ref 21–32)
CREAT SERPL-MCNC: 1.18 MG/DL (ref 0.55–1.02)
DIFFERENTIAL METHOD BLD: ABNORMAL
EOSINOPHIL # BLD: 0.2 K/UL (ref 0–0.4)
EOSINOPHIL NFR BLD: 2 % (ref 0–7)
ERYTHROCYTE [DISTWIDTH] IN BLOOD BY AUTOMATED COUNT: 13.8 % (ref 11.5–14.5)
GLOBULIN SER CALC-MCNC: 4 G/DL (ref 2–4)
GLUCOSE SERPL-MCNC: 110 MG/DL (ref 65–100)
HCT VFR BLD AUTO: 35.4 % (ref 35–47)
HGB BLD-MCNC: 11.1 G/DL (ref 11.5–16)
IMM GRANULOCYTES # BLD AUTO: 0 K/UL
IMM GRANULOCYTES NFR BLD AUTO: 0 %
LYMPHOCYTES # BLD: 0.4 K/UL (ref 0.8–3.5)
LYMPHOCYTES NFR BLD: 5 % (ref 12–49)
MCH RBC QN AUTO: 31.9 PG (ref 26–34)
MCHC RBC AUTO-ENTMCNC: 31.4 G/DL (ref 30–36.5)
MCV RBC AUTO: 101.7 FL (ref 80–99)
MONOCYTES # BLD: 1 K/UL (ref 0–1)
MONOCYTES NFR BLD: 12 % (ref 5–13)
NEUTS SEG # BLD: 6.8 K/UL (ref 1.8–8)
NEUTS SEG NFR BLD: 81 % (ref 32–75)
NRBC # BLD: 0 K/UL (ref 0–0.01)
NRBC BLD-RTO: 0 PER 100 WBC
PLATELET # BLD AUTO: 162 K/UL (ref 150–400)
PMV BLD AUTO: 11 FL (ref 8.9–12.9)
POTASSIUM SERPL-SCNC: 3.9 MMOL/L (ref 3.5–5.1)
PROT SERPL-MCNC: 7.7 G/DL (ref 6.4–8.2)
RBC # BLD AUTO: 3.48 M/UL (ref 3.8–5.2)
RBC MORPH BLD: ABNORMAL
SODIUM SERPL-SCNC: 141 MMOL/L (ref 136–145)
TROPONIN I SERPL-MCNC: <0.05 NG/ML
WBC # BLD AUTO: 8.4 K/UL (ref 3.6–11)

## 2021-06-09 PROCEDURE — 84484 ASSAY OF TROPONIN QUANT: CPT

## 2021-06-09 PROCEDURE — 65660000001 HC RM ICU INTERMED STEPDOWN

## 2021-06-09 PROCEDURE — 99285 EMERGENCY DEPT VISIT HI MDM: CPT

## 2021-06-09 PROCEDURE — 36415 COLL VENOUS BLD VENIPUNCTURE: CPT

## 2021-06-09 PROCEDURE — 71046 X-RAY EXAM CHEST 2 VIEWS: CPT

## 2021-06-09 PROCEDURE — 80053 COMPREHEN METABOLIC PANEL: CPT

## 2021-06-09 PROCEDURE — 85025 COMPLETE CBC W/AUTO DIFF WBC: CPT

## 2021-06-09 PROCEDURE — 93005 ELECTROCARDIOGRAM TRACING: CPT

## 2021-06-09 PROCEDURE — 83880 ASSAY OF NATRIURETIC PEPTIDE: CPT

## 2021-06-09 NOTE — ED TRIAGE NOTES
Patient arrived EMS. EMS reports patient recently had her Lasix increased to help with her CHF and when her home care doctor came to evaluate her today they still heard rales with ausculation of her lungs and saw bilateral lower pitting edema and wanted her to come to the ED to be evaluated.

## 2021-06-09 NOTE — ED NOTES
Bedside shift change report given to Anyi Bruno RN (oncoming nurse) by Candance Primes RN (offgoing nurse). Report included the following information SBAR, Kardex, ED Summary, [facility] and Recent Results.

## 2021-06-09 NOTE — TELEPHONE ENCOUNTER
Freddy Malave spoke with Dr. Dayanna Grullon regarding this patient. Alcario Cockayne agreed to see this patient as soon as possible (pt has CHF) however, I am unable to schedule a new patient with her .       Please advise      THANG:[phone]

## 2021-06-09 NOTE — TELEPHONE ENCOUNTER
Returned PCP call, 2 pt identifiers used    Spoke with PCP Linda's assistant, Juan R Cole is wanting a New Patient appointment with Dr. Edwin HENDRIX for this patient with possible CHF. Provider is out of the office, offered an appointment for next Thursday 6/17/28 but was not soon enough. Offered to schedule an appointment with a different provider but did not want to do that. Advised NP Laura Monroy unable to see a new patients,  must be seen by MD prior. She states she will call back if she has further instructions from PCP.

## 2021-06-09 NOTE — ED PROVIDER NOTES
80-year-old female with a history of coronary artery disease, cardiomyopathy with heart failure, hypertension, hyperlipidemia, mitral regurgitation, atrial fibrillation unclear if she is on anticoagulation or not, was sent by her doctor for shortness of breath for the last few days. She was given 80 mg of Lasix today during the home visit, but still seemed short of breath enough to require her doctor to send her here for further evaluation and treatment. She says she has had a cough for a few days, but no fever no chest pain. No nausea or vomiting. She had not noticed any weight gain or leg swelling. No hemoptysis. She cannot remember if she has been vaccinated against Covid or not. She is not certain whether she gets more short of breath when walking or not. She says, at her age, she does not get up and walk just for the heck of it. She was 89% on room air and does not normally wear oxygen.            Past Medical History:   Diagnosis Date    CAD (coronary artery disease)     Cardiomyopathy ([facility] [address]     Congestive heart failure (HCC)     HTN (hypertension)     Hyperlipemia     Mitral regurgitation     Mitral valvular regurgitation     Nonrheumatic mitral (valve) insufficiency     Osteoporosis     Paroxysmal atrial fibrillation (HCC)     Stasis, venous     Tricuspid regurgitation     Vitamin D deficiency        Past Surgical History:   Procedure Laterality Date    HX APPENDECTOMY      HX COLONOSCOPY  06/2004    HX TONSIL AND ADENOIDECTOMY      HX TUBAL LIGATION           Family History:   Problem Relation Age of Onset    Heart Failure Mother     Heart Failure Father     Thyroid Cancer Sister     Breast Cancer Other        Social History     Socioeconomic History    Marital status:      Spouse name: Not on file    Number of children: Not on file    Years of education: Not on file    Highest education level: Not on file   Occupational History    Not on file   Tobacco Use    Smoking status: Never Smoker    Smokeless tobacco: Never Used   Substance and Sexual Activity    Alcohol use: Never    Drug use: Never    Sexual activity: Not Currently     Partners: Male   Other Topics Concern    Not on file   Social History Narrative    Not on file     Social Determinants of Health     Financial Resource Strain:     Difficulty of Paying Living Expenses:    Food Insecurity:     Worried About Running Out of Food in the Last Year:     [address] in the Last Year:    Transportation Needs:     Lack of Transportation (Medical):  Lack of Transportation (Non-Medical):    Physical Activity:     Days of Exercise per Week:     Minutes of Exercise per Session:    Stress:     Feeling of Stress :    Social Connections:     Frequency of Communication with Friends and Family:     Frequency of Social Gatherings with Friends and Family:     Attends Mandaen Services:     Active Member of Clubs or Organizations:     Attends Club or Organization Meetings:     Marital Status:    Intimate Partner Violence:     Fear of Current or Ex-Partner:     Emotionally Abused:     Physically Abused:     Sexually Abused: ALLERGIES: Evista [raloxifene], Levaquin [levofloxacin], Pcn [penicillins], and Xarelto [rivaroxaban]    Review of Systems   Constitutional: Negative for fever. HENT: Negative for trouble swallowing. Eyes: Negative for visual disturbance. Respiratory: Positive for cough and shortness of breath. Cardiovascular: Negative for chest pain. Gastrointestinal: Negative for abdominal pain. Genitourinary: Negative for difficulty urinating. Musculoskeletal: Negative for gait problem. Skin: Negative for rash. Neurological: Negative for headaches. Hematological: Bruises/bleeds easily. Psychiatric/Behavioral: Negative for sleep disturbance.        Vitals:    06/09/21 1915 06/09/21 1917   BP:  (!) 120/107   Pulse:  89   Resp:  26   Temp:  98.1 °F (36.7 °C)   SpO2: (!) 89% 99%   Weight:  49.2 kg (108 lb 7.5 oz)   Height:  4' 9\" (1.448 m)            Physical Exam  Constitutional:       Appearance: Normal appearance. HENT:      Head: Normocephalic. Nose: Nose normal.      Mouth/Throat:      Mouth: Mucous membranes are moist.   Eyes:      Extraocular Movements: Extraocular movements intact. Conjunctiva/sclera: Conjunctivae normal.   Cardiovascular:      Rate and Rhythm: Normal rate. Rhythm irregular. Heart sounds: Murmur heard. Pulmonary:      Effort: Tachypnea and accessory muscle usage present. No respiratory distress. Breath sounds: Rales present. Abdominal:      General: Abdomen is flat. Musculoskeletal:         General: Normal range of motion. Skin:     Findings: No rash. Neurological:      General: No focal deficit present. Mental Status: She is alert. Psychiatric:         Behavior: Behavior normal.          MDM  Number of Diagnoses or Management Options  Acute on chronic congestive heart failure, unspecified heart failure type (HCC)  Acute pulmonary edema (HCC)  Dyspnea, unspecified type  Longstanding persistent atrial fibrillation (HCC)  Mitral valve insufficiency, unspecified etiology  Diagnosis management comments: EKG at 1920  Atrial fibrillation with normal axis at a rate of 80 bpm  QRS and QTc are within normal limits  No ST changes    Prominent Q-wave in lead III    Patient tells me she has an advanced directive in place and does not want CPR or mechanical ventilation. She is working a little to breathe, but does not seem to require more than the 2 L of oxygen she is currently receiving. The history is consistent with heart failure exacerbation, but we will look for any other evidence of acute coronary syndrome. The cough makes me think pulmonary embolism is less likely, but if I do not see evidence of heart failure on the labs and x-ray, then I may pursue other causes of infection.   No fever, but with the cough there could be infection. Perfect Serve Consult for Admission  8:21 PM    ED Room Number: SER08/08  Patient Name and age:  Germán Tran 80 y.o.  female  Working Diagnosis: Dyspnea, unspecified type  (primary encounter diagnosis)  Acute on chronic congestive heart failure, unspecified heart failure type ([address]  Mitral valve insufficiency, unspecified etiology  Longstanding persistent atrial fibrillation ([address]  Acute pulmonary edema ([facility] [address]    COVID-19 Suspicion:  no  Sepsis present:  no  Reassessment needed: no  Code Status:  Do Not Resuscitate  Readmission: no  Isolation Requirements:  no  Recommended Level of Care:  telemetry  Department:[facility] - [phone]  Other: Sent in by primary care doctor after 80 mg of Lasix (double the usual dose) did not help her dyspnea. Appears to be in acute heart failure, but doing well on 2 L of nasal cannula. I did not think she needed BiPAP. Blood pressure is lower end of normal, so I did not give any additional Lasix and no nitrates. Unclear whether she is anticoagulated or not for her atrial fibrillation.            Procedures

## 2021-06-10 LAB
ATRIAL RATE: 107 BPM
ATRIAL RATE: 77 BPM
CALCULATED R AXIS, ECG10: 20 DEGREES
CALCULATED R AXIS, ECG10: 5 DEGREES
CALCULATED T AXIS, ECG11: -31 DEGREES
CALCULATED T AXIS, ECG11: 21 DEGREES
DIAGNOSIS, 93000: NORMAL
DIAGNOSIS, 93000: NORMAL
GLUCOSE BLD STRIP.AUTO-MCNC: 91 MG/DL (ref 65–117)
Q-T INTERVAL, ECG07: 384 MS
Q-T INTERVAL, ECG07: 388 MS
QRS DURATION, ECG06: 90 MS
QRS DURATION, ECG06: 92 MS
QTC CALCULATION (BEZET), ECG08: 442 MS
QTC CALCULATION (BEZET), ECG08: 458 MS
SERVICE CMNT-IMP: NORMAL
VENTRICULAR RATE, ECG03: 80 BPM
VENTRICULAR RATE, ECG03: 84 BPM

## 2021-06-10 PROCEDURE — 65660000000 HC RM CCU STEPDOWN

## 2021-06-10 PROCEDURE — 93005 ELECTROCARDIOGRAM TRACING: CPT

## 2021-06-10 PROCEDURE — 97116 GAIT TRAINING THERAPY: CPT

## 2021-06-10 PROCEDURE — 77010033678 HC OXYGEN DAILY

## 2021-06-10 PROCEDURE — 97166 OT EVAL MOD COMPLEX 45 MIN: CPT

## 2021-06-10 PROCEDURE — 97535 SELF CARE MNGMENT TRAINING: CPT

## 2021-06-10 PROCEDURE — 99223 1ST HOSP IP/OBS HIGH 75: CPT | Performed by: INTERNAL MEDICINE

## 2021-06-10 PROCEDURE — 74011250637 HC RX REV CODE- 250/637: Performed by: HOSPITALIST

## 2021-06-10 PROCEDURE — 97161 PT EVAL LOW COMPLEX 20 MIN: CPT

## 2021-06-10 PROCEDURE — 82962 GLUCOSE BLOOD TEST: CPT

## 2021-06-10 PROCEDURE — 74011250636 HC RX REV CODE- 250/636: Performed by: HOSPITALIST

## 2021-06-10 RX ORDER — FUROSEMIDE 10 MG/ML
40 INJECTION INTRAMUSCULAR; INTRAVENOUS DAILY
Status: DISCONTINUED | OUTPATIENT
Start: 2021-06-10 | End: 2021-06-12 | Stop reason: HOSPADM

## 2021-06-10 RX ORDER — CARVEDILOL 6.25 MG/1
6.25 TABLET ORAL 2 TIMES DAILY
Status: DISCONTINUED | OUTPATIENT
Start: 2021-06-10 | End: 2021-06-10

## 2021-06-10 RX ORDER — CARVEDILOL 3.12 MG/1
3.12 TABLET ORAL 2 TIMES DAILY
Status: DISCONTINUED | OUTPATIENT
Start: 2021-06-10 | End: 2021-06-12 | Stop reason: HOSPADM

## 2021-06-10 RX ORDER — LISINOPRIL 5 MG/1
2.5 TABLET ORAL DAILY
Status: DISCONTINUED | OUTPATIENT
Start: 2021-06-11 | End: 2021-06-11

## 2021-06-10 RX ORDER — SPIRONOLACTONE 25 MG/1
25 TABLET ORAL DAILY
Status: DISCONTINUED | OUTPATIENT
Start: 2021-06-11 | End: 2021-06-12 | Stop reason: HOSPADM

## 2021-06-10 RX ORDER — ASPIRIN 81 MG/1
81 TABLET ORAL DAILY
Status: DISCONTINUED | OUTPATIENT
Start: 2021-06-10 | End: 2021-06-12 | Stop reason: HOSPADM

## 2021-06-10 RX ORDER — HEPARIN SODIUM 5000 [USP'U]/ML
5000 INJECTION, SOLUTION INTRAVENOUS; SUBCUTANEOUS EVERY 8 HOURS
Status: DISCONTINUED | OUTPATIENT
Start: 2021-06-10 | End: 2021-06-12 | Stop reason: HOSPADM

## 2021-06-10 RX ADMIN — CARVEDILOL 3.12 MG: 3.12 TABLET, FILM COATED ORAL at 21:29

## 2021-06-10 RX ADMIN — HEPARIN SODIUM 5000 UNITS: 5000 INJECTION INTRAVENOUS; SUBCUTANEOUS at 15:03

## 2021-06-10 RX ADMIN — ASPIRIN 81 MG: 81 TABLET, COATED ORAL at 11:22

## 2021-06-10 RX ADMIN — FUROSEMIDE 40 MG: 10 INJECTION, SOLUTION INTRAVENOUS at 11:22

## 2021-06-10 NOTE — PROGRESS NOTES
Admission Medication Reconciliation:    Information obtained from:  patient, patient's daughter, medication bottles  RxQuery data available¹:  YES    Comments/Recommendations: Updated PTA meds/reviewed patient's allergies. 1) The patient and her daughter were able to confirm which medications the patient is taking. She had several of her medication bottles with her, including a bottle of Keflex 500 mg BID that was prescribed yesterday. She had not started taking any of the Keflex before being admitted to the hospital. The daughter believes the Keflex was ordered for concern of pneumonia. 2)  Medication changes (since last review): Added  - None     Adjusted  - None    Removed  - Viactiv  - Debrox  - Vitamin C/D3/Zinc/copper/luteix/zeaxan         ¹RxQuery pharmacy benefit data reflects medications filled and processed through the patient's insurance, however   this data does NOT capture whether the medication was picked up or is currently being taken by the patient. Allergies:  Evista [raloxifene], Levaquin [levofloxacin], Pcn [penicillins], and Xarelto [rivaroxaban]    Significant PMH/Disease States:   Past Medical History:   Diagnosis Date    CAD (coronary artery disease)     [facility]omyopathy ([facility] [address]     Congestive heart failure (HCC)     HTN (hypertension)     Hyperlipemia     Mitral regurgitation     Mitral valvular regurgitation     Nonrheumatic mitral (valve) insufficiency     Osteoporosis     Paroxysmal atrial fibrillation (HCC)     Stasis, venous     Tricuspid regurgitation     Vitamin D deficiency      Chief Complaint for this Admission:    Chief Complaint   Patient presents with    CHF     Prior to Admission Medications:   Prior to Admission Medications   Prescriptions Last Dose Informant Taking?   aspirin delayed-release 81 mg tablet 6/9/2021 at Unknown time  Yes   Sig: Take  by mouth daily.    carvediloL (COREG) 6.25 mg tablet 6/9/2021 at Unknown time  Yes   Sig: Take 6.25 mg by mouth two (2) times a day. cholecalciferol (VITAMIN D3) (2,000 UNITS /50 MCG) cap capsule 6/9/2021 at Unknown time  Yes   Sig: Take 2,000 Units by mouth daily. Indications: low vitamin D levels, Vitamin D Deficiency (High Dose Therapy)   cyanocobalamin (VITAMIN B12) 500 mcg tablet 6/9/2021 at Unknown time  Yes   Sig: Take 1 Tab by mouth daily. ergocalciferol (ERGOCALCIFEROL) 50,000 unit capsule 6/2/2021 at Unknown time  Yes   Sig: Take 1 Cap by mouth every seven (7) days. Indications: Vitamin D Deficiency (High Dose Therapy)   furosemide (LASIX) 40 mg tablet 6/9/2021 at Unknown time  Yes   Sig: Take 40 mg by mouth daily. spironolactone (ALDACTONE) 25 mg tablet 6/9/2021 at Unknown time  Yes   Sig: Take 25 mg by mouth daily. Facility-Administered Medications: None     Please contact the main inpatient pharmacy with any questions or concerns at [phone] and we will direct you to the clinical pharmacist covering this patient's care while in-house.    Harsha Jarquin

## 2021-06-10 NOTE — PROGRESS NOTES
0150: TRANSFER - IN REPORT:    Verbal report received from Monica(name) on Eboni Stewart  being received from [place](unit) for routine progression of care      Report consisted of patients Situation, Background, Assessment and   Recommendations(SBAR). Information from the following report(s) SBAR, ED Summary and Recent Results was reviewed with the receiving nurse. Opportunity for questions and clarification was provided. Assessment completed upon patients arrival to unit and care assumed. [phone] Paged Dr Daria Young. Advised new admit here from short pump.

## 2021-06-10 NOTE — ED NOTES
Patient transported to [facility] with [facility] ambulance transport service.

## 2021-06-10 NOTE — NURSE NAVIGATOR
Chart reviewed by Heart Failure Nurse Navigator. Heart Failure database completed. EF:  Pending. No previous echo reports available     ACEi/ARB/ARNi:     BB: Coreg 6.25 mg BID    Aldosterone Antagonist: Aldactone 25 mg QD    Obstructive Sleep Apnea Screening:   STOP-BANG score:   Referred to Sleep Medicine: screening priority 4/advanced age    CRT . NYHA Functional Class not assigned. Documentation requested. Heart Failure Teach Back in Patient Education. Heart Failure Avoiding Triggers on Discharge Instructions. Cardiologist: ROBERT    MEDICARE HEART FAILURE BUNDLE ACTIVE      Post discharge follow up phone call to be made within 48-72 hours of discharge.

## 2021-06-10 NOTE — PROGRESS NOTES
Problem: Mobility Impaired (Adult and Pediatric)  Goal: *Acute Goals and Plan of Care (Insert Text)  Description: FUNCTIONAL STATUS PRIOR TO ADMISSION: Patient was independent without use of DME household distances. HOME SUPPORT PRIOR TO ADMISSION: The patient lived alone with care aides and family to provide assistance as needed. Patient is alone most nights and intermittently throughout the day. Physical Therapy Goals  Initiated 6/10/2021  1. Patient will move from supine to sit and sit to supine  in bed with independence within 7 day(s). 2.  Patient will transfer from bed to chair and chair to bed with independence using the least restrictive device within 7 day(s). 3.  Patient will perform sit to stand with  independence within 7 day(s). 4.  Patient will ambulate with supervision for 200 feet with the least restrictive device within 7 day(s). Outcome: Progressing Towards Goal    PHYSICAL THERAPY EVALUATION  Patient: Steve Boyd (38 y.o. female)  Date: 6/10/2021  Primary Diagnosis: CHF, acute on chronic (HCC) [I50.9]        Precautions:        ASSESSMENT  Based on the objective data described below, the patient presents with general fatigue, some unsteadiness, and decreased activity tolerance s/p hospitalization for CHF exacerbation. Patient would benefit from device use, but she is not interested at this time. She uses a cane for community ambulation. Patient has good family support and care aides throughout the day to assist with ADLs as needed. Today, with encouragement, Patient able to ambulate in the hallway with HHA. Slow but steady gait pattern. No SOB observed and vitals stable throughout. Patient educated on the importance of maintaining strength and activity tolerance while hospitalized. Family members present and agreeable to ambulating with Patient throughout her stay. Patient demonstrating good mobility given her age group.  Suspect good progress as fluid continues to come off.     Will follow up Monday if still hospitalized, otherwise recommend home discharge with continued family/caregiver assist and HHPT     Current Level of Function Impacting Discharge (mobility/balance): HHA for ambulation    Functional Outcome Measure: The patient scored 20/28 on the Tinetti balance outcome measure which is indicative of moderate fall risk. Patient will benefit from skilled therapy intervention to address the above noted impairments. PLAN :  Recommendations and Planned Interventions: bed mobility training, transfer training, gait training, therapeutic exercises, patient and family training/education, and therapeutic activities      Frequency/Duration: Patient will be followed by physical therapy:  3 times a week to address goals. Recommendation for discharge: (in order for the patient to meet his/her long term goals)  Physical therapy at least 2 days/week in the home     This discharge recommendation:  Has not yet been discussed the attending provider and/or case management    IF patient discharges home will need the following DME: patient owns DME required for discharge, has cane and rollator as needed         SUBJECTIVE:   Patient stated I wasn't planning to walk today. It's not that I CANT do it.  I just wasn't going to    OBJECTIVE DATA SUMMARY:   HISTORY:    Past Medical History:   Diagnosis Date    CAD (coronary artery disease)     Cardiomyopathy ([facility] [address]     Congestive heart failure (HCC)     HTN (hypertension)     Hyperlipemia     Mitral regurgitation     Mitral valvular regurgitation     Nonrheumatic mitral (valve) insufficiency     Osteoporosis     Paroxysmal atrial fibrillation (HCC)     Stasis, venous     Tricuspid regurgitation     Vitamin D deficiency      Past Surgical History:   Procedure Laterality Date    HX APPENDECTOMY      HX COLONOSCOPY  06/2004    HX TONSIL AND ADENOIDECTOMY      HX TUBAL LIGATION         Personal factors and/or comorbidities impacting plan of care: MVR    Home Situation  Home Environment: [place] [address] Name: [facility]  One/Two Story Residence: One story  Living Alone: Yes  Support Systems: Family member(s), Child(devi) (call morning, noon and night; 10-12 hired caregiver IADLs)  Patient Expects to be Discharged to[de-identified] Apartment  Current DME Used/Available at Home: Cane, straight, Grab bars, Walker, rollator (built in shower bench)  Tub or Shower Type: Shower    EXAMINATION/PRESENTATION/DECISION MAKING:   Critical Behavior:  Neurologic State: Alert  Orientation Level: Oriented X4  Cognition: Appropriate decision making, Appropriate for age attention/concentration, Appropriate safety awareness  Safety/Judgement: Awareness of environment, Good awareness of safety precautions  Hearing: Auditory  Auditory Impairment: Hard of hearing, bilateral    Range Of Motion:  AROM: Generally decreased, functional (shoulder flexion 90*)    Strength:    Strength: Generally decreased, functional    Tone & Sensation:   Tone: Normal  Sensation: Intact    Coordination:  Coordination: Within functional limits    Vision:   Acuity: Impaired near vision (macular degeneration)  Corrective Lenses: Reading glasses  Functional Mobility:  Bed Mobility:  Supine to Sit: Modified independent  Scooting: Modified independent    Transfers:  Sit to Stand: Supervision  Stand to Sit: Supervision  Bed to Chair: Contact guard assistance    Balance:   Sitting: Intact; Without support  Standing: Impaired; Without support  Standing - Static: Fair (one hand supportive surface at all times)  Standing - Dynamic : Poor (B hands supportive surface, declines RW)    Ambulation/Gait Training:  Distance (ft): 100 Feet (ft)  Assistive Device: Gait belt  Ambulation - Level of Assistance: Contact guard assistance (HHA)  Gait Abnormalities: Decreased step clearance  Base of Support: Narrowed  Speed/Brenda: Slow  Step Length: Right shortened;Left shortened    Functional Measure:  Tinetti test:    Sitting Balance: 1  Arises: 1  Attempts to Rise: 2  Immediate Standing Balance: 1  Standing Balance: 2  Nudged: 1  Eyes Closed: 1  Turn 360 Degrees - Continuous/Discontinuous: 1  Turn 360 Degrees - Steady/Unsteady: 1  Sitting Down: 1  Balance Score: 12 Balance total score  Indication of Gait: 1  R Step Length/Height: 0  L Step Length/Height: 0  R Foot Clearance: 1  L Foot Clearance: 1  Step Symmetry: 1  Step Continuity: 1  Path: 1  Trunk: 1  Walking Time: 1  Gait Score: 8 Gait total score  Total Score: 20/28 Overall total score         Tinetti Tool Score Risk of Falls  <19 = High Fall Risk  19-24 = Moderate Fall Risk  25-28 = Low Fall Risk  Tinetti ME. Performance-Oriented Assessment of Mobility Problems in Elderly Patients. [facility] 66; [id number].  (Scoring Description: PT Bulletin Feb. 10, 1993)    Older adults: Rakan Elizabeth et al, 2009; n = 1000 [facility] elderly evaluated with ABC, JENSEN, ADL, and IADL)  · Mean JENSEN score for males aged 69-68 years = 26.21(3.40)  · Mean JENSEN score for females age 69-68 years = 25.16(4.30)  · Mean JENSEN score for males over 80 years = 23.29(6.02)  · Mean JENSEN score for females over 80 years = 17.20(8.32)           Physical Therapy Evaluation Charge Determination   History Examination Presentation Decision-Making   LOW Complexity : Zero comorbidities / personal factors that will impact the outcome / POC LOW Complexity : 1-2 Standardized tests and measures addressing body structure, function, activity limitation and / or participation in recreation  LOW Complexity : Stable, uncomplicated  LOW Complexity : FOTO score of       Based on the above components, the patient evaluation is determined to be of the following complexity level: LOW     Pain Rating:  No pain reported during assessment    Activity Tolerance:   Good, SpO2 stable on RA, requires rest breaks, and BP low but stable 95/56, no dizziness    After treatment patient left in no apparent distress:   Sitting in chair, Call bell within reach, and Caregiver / family present    COMMUNICATION/EDUCATION:   The patients plan of care was discussed with: Registered nurse. Fall prevention education was provided and the patient/caregiver indicated understanding., Patient/family have participated as able in goal setting and plan of care. , and Patient/family agree to work toward stated goals and plan of care.     Thank you for this referral.  Natanael Marie, PT, DPT  Geriatric Clinical Specialist     Time Calculation: 19 mins

## 2021-06-10 NOTE — PROGRESS NOTES
[facility] [address] [facility] [address]   [address] [facility], [facility]   [phone]  Rise [place]  6/10/2021      Assessment/Plan:    1. Hypoxic respiratory failure   - SpO2 on RA 89%   - mild increase in pulmonary vasculature on CXR   - NC Oxygen   - proBNP 6,123 - Lasix IV 40 mg daily   - echo ordered  2. HFrEF   - follows up with Dr. Caridad Pinon   - EF 25-30% - 2016   - PTA Coreg, Lasix and Aldactone. 3. PAfib   - Coreg 6.25 mg BID   - Previous use of OAC, stopped for anemia and low Hgb to 6.0   - ASA only  4. HTN   - PTA Coreg, Lasix and Aldactone  5. MR   - severe   - Moderate TR   - echo pending     HPI:  Presented for SOB. No c/o CP. No increased weight gain or edema. O2 sats 89% on RA in ED     Medical history:  CAD, NICM, HTN, HLD, MR, TR, PAF. Katelyn Jenkins MD to see this patient, as well, and provide full consult note.       Bryan Willis PA-C

## 2021-06-10 NOTE — H&P
History & Physical    Primary Care Provider: Alfonzo Tripathi NP  Source of Information: Patient     Please note that this dictation was completed with [employer], the computer voice recognition software. Quite often unanticipated grammatical, syntax, homophones, and other interpretive errors are inadvertently transcribed by the computer software. Please disregard these errors. Please excuse any errors that have escaped final proofreading. History of Presenting Illness:   Jo Camarena is a 80 y.o. female who presents with shortness of breath. History obtained from the patient at bedside patient said that she is residing in [facility] and yesterday night she had some feeling of shortness of breath and she called her daughters. Subsequent communication was made through the [facility] staff and patient was sent to [facility] for further management. Patient is a poor historian but she could tell me that she is at [facility] and she has 3 daughters who takes care of her and currently she has no symptoms. The patient denies any fever, chills, chest pain, cough, congestion, recent illness, palpitations, or dysuria. Review of Systems:  A comprehensive review of systems was negative. Past Medical History:   Diagnosis Date    CAD (coronary artery disease)     Cardiomyopathy ([facility] [address]     Congestive heart failure (HCC)     HTN (hypertension)     Hyperlipemia     Mitral regurgitation     Mitral valvular regurgitation     Nonrheumatic mitral (valve) insufficiency     Osteoporosis     Paroxysmal atrial fibrillation (HCC)     Stasis, venous     Tricuspid regurgitation     Vitamin D deficiency       Past Surgical History:   Procedure Laterality Date    HX APPENDECTOMY      HX COLONOSCOPY  06/2004    HX TONSIL AND ADENOIDECTOMY      HX TUBAL LIGATION       Prior to Admission medications    Medication Sig Start Date End Date Taking?  Authorizing Provider   carvediloL (COREG) 6.25 mg tablet Take 6.25 mg by mouth two (2) times a day. 11/23/19  Yes Provider, Historical   ergocalciferol (ERGOCALCIFEROL) 50,000 unit capsule Take 1 Cap by mouth every seven (7) days. Indications: Vitamin D Deficiency (High Dose Therapy) 11/25/19  Yes Caden Blackwood NP   cholecalciferol (VITAMIN D3) (2,000 UNITS /50 MCG) cap capsule Take 2,000 Units by mouth daily. Indications: low vitamin D levels, Vitamin D Deficiency (High Dose Therapy) 11/25/19  Yes Caden Blackwood NP   cyanocobalamin (VITAMIN B12) 500 mcg tablet Take 1 Tab by mouth daily. 11/25/19  Yes Caden Blackwood NP   spironolactone (ALDACTONE) 25 mg tablet Take 25 mg by mouth daily. 9/30/19  Yes Provider, Historical   furosemide (LASIX) 40 mg tablet Take 40 mg by mouth daily. 9/30/19  Yes Provider, Historical   aspirin delayed-release 81 mg tablet Take  by mouth daily. Yes Provider, Historical     Allergies   Allergen Reactions    Evista [Raloxifene] Other (comments)    Levaquin [Levofloxacin] Unknown (comments)    Pcn [Penicillins] Unknown (comments)    Xarelto [Rivaroxaban] Rash      Family History   Problem Relation Age of Onset    Heart Failure Mother     Heart Failure Father     Thyroid Cancer Sister     Breast Cancer Other         SOCIAL HISTORY:  Patient resides:  Independently    Assisted Living X   SNF    With family care       Smoking history:   None X   Former    Chronic      Alcohol history:   None X   Social    Chronic      Ambulates:   Independently X   w/cane    w/walker    w/wc    CODE STATUS:  DNR X   Full    Other      Objective:     Physical Exam:     Visit Vitals  BP (!) 113/45 (BP Patient Position: Sitting)   Pulse 89   Temp 98.5 °F (36.9 °C)   Resp 22   Ht 4' 9\" (1.448 m)   Wt 48.3 kg (106 lb 8 oz)   SpO2 92%   BMI 23.05 kg/m²    O2 Flow Rate (L/min): 2 l/min O2 Device: None (Room air) (self weaned)    General:  Alert, cooperative, no distress, appears stated age.    Head: Normocephalic, without obvious abnormality, atraumatic. Eyes:  Conjunctivae/corneas clear. PERRL, EOMs intact. Nose: Nares normal. Septum midline. Mucosa normal. No drainage or sinus tenderness. Throat: Lips, mucosa, and tongue normal. Teeth and gums normal.   Neck: Supple, symmetrical, trachea midline, no adenopathy, thyroid: no enlargement/tenderness/nodules, no carotid bruit and no JVD. Lungs:   Clear to auscultation bilaterally. Heart:  Regular rate and rhythm, S1, S2 normal, no murmur, click, rub or gallop. Abdomen:   Soft, non-tender. Bowel sounds normal. No masses,  No organomegaly. Extremities: Extremities normal, atraumatic, no cyanosis or edema. Pulses: 2+ and symmetric all extremities. Skin: Skin color, texture, turgor normal. No rashes or lesions   Neurologic: CNII-XII intact. EKG: Atrial fibrillation      Data Review:     Recent Days:  Recent Labs     06/09/21 1919   WBC 8.4   HGB 11.1*   HCT 35.4        Recent Labs     06/09/21 1919      K 3.9      CO2 27   *   BUN 15   CREA 1.18*   CA 9.4   ALB 3.7   TBILI 1.9*   ALT 25     No results for input(s): PH, PCO2, PO2, HCO3, FIO2 in the last 72 hours. 24 Hour Results:  Recent Results (from the past 24 hour(s))   CBC WITH AUTOMATED DIFF    Collection Time: 06/09/21  7:19 PM   Result Value Ref Range    WBC 8.4 3.6 - 11.0 K/uL    RBC 3.48 (L) 3.80 - 5.20 M/uL    HGB 11.1 (L) 11.5 - 16.0 g/dL    HCT 35.4 35.0 - 47.0 %    .7 (H) 80.0 - 99.0 FL    MCH 31.9 26.0 - 34.0 PG    MCHC 31.4 30.0 - 36.5 g/dL    RDW 13.8 11.5 - 14.5 %    PLATELET 539 982 - 572 K/uL    MPV 11.0 8.9 - 12.9 FL    NRBC 0.0 0  WBC    ABSOLUTE NRBC 0.00 0.00 - 0.01 K/uL    NEUTROPHILS 81 (H) 32 - 75 %    LYMPHOCYTES 5 (L) 12 - 49 %    MONOCYTES 12 5 - 13 %    EOSINOPHILS 2 0 - 7 %    BASOPHILS 0 0 - 1 %    IMMATURE GRANULOCYTES 0 %    ABS. NEUTROPHILS 6.8 1.8 - 8.0 K/UL    ABS.  LYMPHOCYTES 0.4 (L) 0.8 - 3.5 K/UL ABS. MONOCYTES 1.0 0.0 - 1.0 K/UL    ABS. EOSINOPHILS 0.2 0.0 - 0.4 K/UL    ABS. BASOPHILS 0.0 0.0 - 0.1 K/UL    ABS. IMM. GRANS. 0.0 K/UL    DF MANUAL      RBC COMMENTS MACROCYTOSIS  1+       METABOLIC PANEL, COMPREHENSIVE    Collection Time: 06/09/21  7:19 PM   Result Value Ref Range    Sodium 141 136 - 145 mmol/L    Potassium 3.9 3.5 - 5.1 mmol/L    Chloride 101 97 - 108 mmol/L    CO2 27 21 - 32 mmol/L    Anion gap 13 5 - 15 mmol/L    Glucose 110 (H) 65 - 100 mg/dL    BUN 15 6 - 20 MG/DL    Creatinine 1.18 (H) 0.55 - 1.02 MG/DL    BUN/Creatinine ratio 13 12 - 20      GFR est AA 52 (L) >60 ml/min/1.73m2    GFR est non-AA 43 (L) >60 ml/min/1.73m2    Calcium 9.4 8.5 - 10.1 MG/DL    Bilirubin, total 1.9 (H) 0.2 - 1.0 MG/DL    ALT (SGPT) 25 12 - 78 U/L    AST (SGOT) 22 15 - 37 U/L    Alk. phosphatase 114 45 - 117 U/L    Protein, total 7.7 6.4 - 8.2 g/dL    Albumin 3.7 3.5 - 5.0 g/dL    Globulin 4.0 2.0 - 4.0 g/dL    A-G Ratio 0.9 (L) 1.1 - 2.2     NT-PRO BNP    Collection Time: 06/09/21  7:19 PM   Result Value Ref Range    NT pro-BNP 6,123 (H) 0 - 450 PG/ML   TROPONIN I    Collection Time: 06/09/21  7:19 PM   Result Value Ref Range    Troponin-I, Qt. <0.05 <0.05 ng/mL   GLUCOSE, POC    Collection Time: 06/10/21  8:22 AM   Result Value Ref Range    Glucose (POC) 91 65 - 117 mg/dL    Performed by Monica Leung    EKG, 12 LEAD, INITIAL    Collection Time: 06/10/21 11:49 AM   Result Value Ref Range    Ventricular Rate 84 BPM    Atrial Rate 107 BPM    QRS Duration 90 ms    Q-T Interval 388 ms    QTC Calculation (Bezet) 458 ms    Calculated R Axis 5 degrees    Calculated T Axis -31 degrees    Diagnosis       Atrial fibrillation  Inferior infarct , age undetermined  When compared with ECG of 09-JUN-2021 19:20,  Inferior infarct is now present           Imaging:     XR CHEST PA LAT    Result Date: 6/9/2021  1. Diffuse interstitial prominence most suggestive of pulmonary vascular congestion.  Enlarged cardiopericardial silhouette       Admit to inpatient status      Patient was explained about the risk of admission including and not a complete list including risk of falls,fractures,blood clots,allergic reactions,infections. Patient/family also understands and agrees to the treatment plan including medications and side effect profiles and also understand the risk with radiation while undergoing imaging studies. The patient and the family/friends (after permission given by the patient to discuss) understand this and agree with the admission plan. Assessment:     Active Problems:    CHF, acute on chronic ([address] (6/9/2021)           Plan:     1. Shortness of breath due to  CHF NYHA 2-3  -Patient could not tell me whether she carries any diagnosis of CHF her BNP was 6K  -Given Lasix 80 mg in the ED  -Continue Lasix 40 mg daily get echocardiogram cardiology consulted  -Patient is on beta-blocker Coreg 6.125 will reduce due to hypotension we will add ACE inhibitor as well  -Daily weight I's and O's consulted cardiology follows as outpatient with Dr. Caridad Pinon  -Non-ischemic COM: ef 25-30%   Severe MR, Moderate TR: This intervention was found PER chart review was on Lasix    2. Atrial fibrillation  -Seen on EKG patient's rate is below 100  - Previously on [address], however this was stopped 2/2 hgb of 6. Previously recommended ICD, however, she declined. C/w BB  -Patient is elderly with fall risk defer anticoagulation to cardiology    3. Mild Cognitive Impairment: Pt still residing in [place] apartments. Staff to continue with supportive care.     4.  PT OT Case management consult for dispo planning    5. DVT prophylaxis SCD ambulates at baseline lives at [place] [id number]    6.   CODE STATUS DNR patient confirmed by her SELF       Signed By: Jasper Otoole MD     Arin 10, 2021

## 2021-06-10 NOTE — PROGRESS NOTES
Problem: Self Care Deficits Care Plan (Adult)  Goal: *Acute Goals and Plan of Care (Insert Text)  Description:   FUNCTIONAL STATUS PRIOR TO ADMISSION: Patient was modified independent using a single point cane for functional mobility, declines Rollator because it makes her \"humped over\". Modified independence basic ADLs, brief for urinary leakage, standing at sink to sponge bath, and sits for lower body dressing. Shower every other month when daughter 2* her hair. Food delivery to door. Walks to the other buildings only to access the parlor, will then go to dining center, and only with daughter. Passes the time completing ADLs, geneology and writing her own obituary. HOME SUPPORT: The patient lived alone with daughters and private aid 10-2 to provide assistance for instrumental ADLs and basic ADLs PRN. Occupational Therapy Goals  Initiated 6/10/2021  1. Patient will perform lower body dressing with supervision/set-up within 7 day(s). 2.  Patient will perform bathing with supervision/set-up within 7 day(s). 3.  Patient will perform gathering ADL items high and low 4/4 with cane/HHA with supervision/set-up within 7 day(s). 4.  Patient will utilize energy conservation techniques during functional activities with verbal cues within 7 day(s). Outcome: Progressing Towards Goal   OCCUPATIONAL THERAPY EVALUATION  Patient: Madison Eli (17 y.o. female)  Date: 6/10/2021  Primary Diagnosis: CHF, acute on chronic (HCC) [I50.9]        Precautions:        ASSESSMENT  Based on the objective data described below, the patient presents with ADLs impaired by standing tolerance, standing balance (uses a cane at baseline and now using B hands for support) and cardiopulmonary tolerance.     Current Level of Function Impacting Discharge (ADLs/self-care): setup upper body ADLs; moderate assistance lower body ADLs; functional mobility to and from bathroom -chair with moderate assistance B hands for support    Functional Outcome Measure: The patient scored Total: 55/100 on the Barthel Index outcome measure which is indicative of 45% impaired ability to care for basic self needs/dependency on others; inferred 100% dependency on others for instrumental ADLs. Other factors to consider for discharge: daughters and private aid     Patient will benefit from skilled therapy intervention to address the above noted impairments. PLAN :  Recommendations and Planned Interventions: self care training, functional mobility training, therapeutic exercise, balance training, therapeutic activities, endurance activities, patient education, home safety training, and family training/education    Frequency/Duration: Patient will be followed by occupational therapy 3 times a week to address goals. Recommendation for discharge: (in order for the patient to meet his/her long term goals)  Occupational therapy at least 2 days/week in the home AND ensure assist and/or supervision for safety with functional mobility or utilize RW if PT     This discharge recommendation:  Has not yet been discussed the attending provider and/or case management    IF patient discharges home will need the following DME: none       SUBJECTIVE:   Patient stated I will lie in bed.     OBJECTIVE DATA SUMMARY:   HISTORY:   Past Medical History:   Diagnosis Date    CAD (coronary artery disease)     Cardiomyopathy ([address]     Congestive heart failure (HCC)     HTN (hypertension)     Hyperlipemia     Mitral regurgitation     Mitral valvular regurgitation     Nonrheumatic mitral (valve) insufficiency     Osteoporosis     Paroxysmal atrial fibrillation (HCC)     Stasis, venous     Tricuspid regurgitation     Vitamin D deficiency      Past Surgical History:   Procedure Laterality Date    HX APPENDECTOMY      HX COLONOSCOPY  06/2004    HX TONSIL AND ADENOIDECTOMY      HX TUBAL LIGATION         Expanded or extensive additional review of patient history:     [address] [place] [address] Environment: [address] 83 [place] [facility] [place]Two Story Residence: One story  Living Alone: No  Support Systems: Family member(s), Child(devi) (call morning, noon and night; 10-12 hired caregiver IADLs)  Patient Expects to be Discharged to[de-identified] Assisted living  Current DME Used/Available at Home: Freada Canelo, straight, Grab bars, Walker, rollator (built in shower bench)  Tub or Shower Type: Shower    Hand dominance: Right    EXAMINATION OF PERFORMANCE DEFICITS:  Cognitive/Behavioral Status:  Neurologic State: Alert  Orientation Level: Oriented X4  Cognition: Appropriate decision making; Appropriate for age attention/concentration; Appropriate safety awareness  Perception: Appears intact  Perseveration: No perseveration noted  Safety/Judgement: Awareness of environment;Good awareness of safety precautions    Skin: intact    Edema: intact    Hearing: Auditory  Auditory Impairment: Hard of hearing, bilateral    Vision/Perceptual:                           Acuity: Impaired near vision (macular degeneration)    Corrective Lenses: Reading glasses    Range of Motion:    AROM: Generally decreased, functional (shoulder flexion 90*)                         Strength:    Strength: Generally decreased, functional                Coordination:  Coordination: Within functional limits  Fine Motor Skills-Upper: Left Intact; Right Intact    Gross Motor Skills-Upper: Left Intact; Right Intact    Tone & Sensation:    Tone: Normal  Sensation: Intact                      Balance:  Sitting: Intact; Without support  Standing: Impaired; Without support  Standing - Static: Fair (one hand supportive surface at all times)  Standing - Dynamic : Poor (B hands supportive surface, declines RW)    Functional Mobility and Transfers for ADLs:  Bed Mobility:  Supine to Sit: Modified independent  Scooting: Modified independent    Transfers:  Sit to Stand: Supervision  Stand to Sit: Supervision  Bathroom Mobility: Moderate assistance (B hand support intermittently, setup door & lights)  Toilet Transfer : Supervision (grab bar)  Shower Transfer: Total assistance (not tested at this time)    ADL Assessment:  Feeding: Independent    Oral Facial Hygiene/Grooming: Supervision (standing at sink; one hand on stable surface) standing tolerance ~4 mins    Bathing: Moderate assistance (infer assistance due to endurance and standing balance)    Upper Body Dressing: Setup inferred    Lower Body Dressing: Moderate assistance (standing balance)    Toileting: Moderate assistance (brief total; no assistance hygiene) Alyssa anguiano                ADL Intervention and task modifications:        Patient instructed and indicated understanding the benefits of maintaining activity tolerance, functional mobility, and independence with self care tasks during acute stay  to ensure safe return home and to baseline. Encouraged patient to increase frequency and duration OOB, be out of bed for all meals, perform daily ADLs (as approved by RN/MD regarding bathing etc), and performing functional mobility to/from bathroom. Cognitive Retraining  Safety/Judgement: Awareness of environment;Good awareness of safety precautions    Therapeutic Exercise:     Functional Measure:  Barthel Index:    Bathin  Bladder: 5  Bowels: 10  Groomin  Dressin  Feeding: 10  Mobility: 5  Stairs: 0  Toilet Use: 5  Transfer (Bed to Chair and Back): 10  Total: 55/100        The Barthel ADL Index: Guidelines  1. The index should be used as a record of what a patient does, not as a record of what a patient could do. 2. The main aim is to establish degree of independence from any help, physical or verbal, however minor and for whatever reason. 3. The need for supervision renders the patient not independent. 4. A patient's performance should be established using the best available evidence.  Asking the patient, friends/relatives and nurses are the usual sources, but direct observation and common sense are also important. However direct testing is not needed. 5. Usually the patient's performance over the preceding 24-48 hours is important, but occasionally longer periods will be relevant. 6. Middle categories imply that the patient supplies over 50 per cent of the effort. 7. Use of aids to be independent is allowed. [facility] Peer., Barthel, D.W. ([id number]). Functional evaluation: the Barthel Index. [address] (14)2. STALIN Segundo, Cecilio Rivera., Merlin Ates., [place], [address] (1999). Measuring the change indisability after inpatient rehabilitation; comparison of the responsiveness of the Barthel Index and Functional [place] Measure. Journal of Neurology, Neurosurgery, and Psychiatry, 66(4), 822-833. LONNIE Veloz, MIRZA Arguelles, & Sourav Guzman M.A. (2004.) Assessment of post-stroke quality of life in cost-effectiveness studies: The usefulness of the Barthel Index and the EuroQoL-5D. Quality of Life Research, 15, 137-41         Occupational Therapy Evaluation Charge Determination   History Examination Decision-Making           Based on the above components, the patient evaluation is determined to be of the following complexity level:   Pain Rating:  None noted    Activity Tolerance:   Fair and requires rest breaks  Vitals:    06/10/21 1122 06/10/21 1216 06/10/21 1230 06/10/21 1244   BP: 123/70 (!) 102/57 98/81 (!) 113/45   BP 1 Location: Left upper arm      BP Patient Position: At rest Supine Standing Sitting   Pulse: 78 82 96 89   Temp: 98.5 °F (36.9 °C)      Resp: 19 19 (!) 39 22   Height:       Weight: 48.3 kg (106 lb 8 oz)      SpO2: 100% 95% 90% 92%         After treatment patient left in no apparent distress:    Sitting in chair, Call bell within reach, and Caregiver / family present    COMMUNICATION/EDUCATION:   The patients plan of care was discussed with: Registered nurse.      Home safety education was provided and the patient/caregiver indicated understanding., Patient/family have participated as able in goal setting and plan of care. , and Patient/family agree to work toward stated goals and plan of care. This patients plan of care is appropriate for delegation to [facility].     Thank you for this referral.  Angel Johnson  Time Calculation: 39 mins

## 2021-06-11 ENCOUNTER — APPOINTMENT (OUTPATIENT)
Dept: NON INVASIVE DIAGNOSTICS | Age: 86
DRG: 292 | End: 2021-06-11
Attending: HOSPITALIST
Payer: MEDICARE

## 2021-06-11 LAB
ALBUMIN SERPL-MCNC: 3 G/DL (ref 3.5–5)
ALBUMIN/GLOB SERPL: 0.8 {RATIO} (ref 1.1–2.2)
ALP SERPL-CCNC: 96 U/L (ref 45–117)
ALT SERPL-CCNC: 19 U/L (ref 12–78)
ANION GAP SERPL CALC-SCNC: 7 MMOL/L (ref 5–15)
AST SERPL-CCNC: 17 U/L (ref 15–37)
BASOPHILS # BLD: 0 K/UL (ref 0–0.1)
BASOPHILS NFR BLD: 1 % (ref 0–1)
BILIRUB SERPL-MCNC: 1.4 MG/DL (ref 0.2–1)
BUN SERPL-MCNC: 22 MG/DL (ref 6–20)
BUN/CREAT SERPL: 23 (ref 12–20)
CALCIUM SERPL-MCNC: 8.8 MG/DL (ref 8.5–10.1)
CHLORIDE SERPL-SCNC: 103 MMOL/L (ref 97–108)
CO2 SERPL-SCNC: 31 MMOL/L (ref 21–32)
CREAT SERPL-MCNC: 0.96 MG/DL (ref 0.55–1.02)
DIFFERENTIAL METHOD BLD: ABNORMAL
ECHO AO ROOT DIAM: 2.62 CM
ECHO AV AREA PEAK VELOCITY: 1.52 CM2
ECHO AV AREA VTI: 1.51 CM2
ECHO AV AREA/BSA PEAK VELOCITY: 1.1 CM2/M2
ECHO AV AREA/BSA VTI: 1.1 CM2/M2
ECHO AV MEAN GRADIENT: 2.83 MMHG
ECHO AV PEAK GRADIENT: 5.01 MMHG
ECHO AV PEAK VELOCITY: 111.95 CM/S
ECHO AV VTI: 22.02 CM
ECHO EST RA PRESSURE: 8 MMHG
ECHO LA AREA 4C: 28.15 CM2
ECHO LA MAJOR AXIS: 4.67 CM
ECHO LA MINOR AXIS: 3.41 CM
ECHO LA VOL 2C: 122.91 ML (ref 22–52)
ECHO LA VOL 4C: 91.94 ML (ref 22–52)
ECHO LA VOL BP: 113.02 ML (ref 22–52)
ECHO LA VOL/BSA BIPLANE: 82.5 ML/M2 (ref 16–28)
ECHO LA VOLUME INDEX A2C: 89.72 ML/M2 (ref 16–28)
ECHO LA VOLUME INDEX A4C: 67.11 ML/M2 (ref 16–28)
ECHO LV EDV A2C: 69.65 ML
ECHO LV EDV A4C: 68.32 ML
ECHO LV EDV BP: 70.1 ML (ref 56–104)
ECHO LV EDV INDEX A4C: 49.9 ML/M2
ECHO LV EDV INDEX BP: 51.2 ML/M2
ECHO LV EDV NDEX A2C: 50.8 ML/M2
ECHO LV EJECTION FRACTION A2C: 43 PERCENT
ECHO LV EJECTION FRACTION A4C: 49 PERCENT
ECHO LV EJECTION FRACTION BIPLANE: 45.8 PERCENT (ref 55–100)
ECHO LV ESV A2C: 39.75 ML
ECHO LV ESV A4C: 34.57 ML
ECHO LV ESV BP: 38.03 ML (ref 19–49)
ECHO LV ESV INDEX A2C: 29 ML/M2
ECHO LV ESV INDEX A4C: 25.2 ML/M2
ECHO LV ESV INDEX BP: 27.8 ML/M2
ECHO LV INTERNAL DIMENSION DIASTOLIC: 4.93 CM (ref 3.9–5.3)
ECHO LV INTERNAL DIMENSION SYSTOLIC: 3.73 CM
ECHO LV IVSD: 0.8 CM (ref 0.6–0.9)
ECHO LV MASS 2D: 131.5 G (ref 67–162)
ECHO LV MASS INDEX 2D: 96 G/M2 (ref 43–95)
ECHO LV POSTERIOR WALL DIASTOLIC: 0.79 CM (ref 0.6–0.9)
ECHO LVOT DIAM: 1.89 CM
ECHO LVOT PEAK GRADIENT: 1.49 MMHG
ECHO LVOT PEAK VELOCITY: 60.97 CM/S
ECHO LVOT SV: 33.3 ML
ECHO LVOT VTI: 11.92 CM
ECHO MV A VELOCITY: 45.14 CM/S
ECHO MV AREA PHT: 5.4 CM2
ECHO MV E DECELERATION TIME (DT): 140.39 MS
ECHO MV E VELOCITY: 97.97 CM/S
ECHO MV E/A RATIO: 2.17
ECHO MV EROA PISA: 0.14 CM2
ECHO MV PRESSURE HALF TIME (PHT): 40.71 MS
ECHO MV REGURGITANT RADIUS PISA: 0.63 CM
ECHO MV REGURGITANT VOLUME: 25.31 ML
ECHO MV REGURGITANT VTIA: 176.47 CM
ECHO PV PEAK INSTANTANEOUS GRADIENT SYSTOLIC: 1.54 MMHG
ECHO RIGHT VENTRICULAR SYSTOLIC PRESSURE (RVSP): 62.33 MMHG
ECHO RV INTERNAL DIMENSION: 3.63 CM
ECHO RV TAPSE: 1.12 CM (ref 1.5–2)
ECHO TV REGURGITANT MAX VELOCITY: 368.55 CM/S
ECHO TV REGURGITANT PEAK GRADIENT: 54.33 MMHG
EOSINOPHIL # BLD: 0.2 K/UL (ref 0–0.4)
EOSINOPHIL NFR BLD: 4 % (ref 0–7)
ERYTHROCYTE [DISTWIDTH] IN BLOOD BY AUTOMATED COUNT: 13.2 % (ref 11.5–14.5)
GLOBULIN SER CALC-MCNC: 3.6 G/DL (ref 2–4)
GLUCOSE SERPL-MCNC: 77 MG/DL (ref 65–100)
HCT VFR BLD AUTO: 34.4 % (ref 35–47)
HGB BLD-MCNC: 10.7 G/DL (ref 11.5–16)
IMM GRANULOCYTES # BLD AUTO: 0 K/UL (ref 0–0.04)
IMM GRANULOCYTES NFR BLD AUTO: 0 % (ref 0–0.5)
LYMPHOCYTES # BLD: 1.4 K/UL (ref 0.8–3.5)
LYMPHOCYTES NFR BLD: 29 % (ref 12–49)
MAGNESIUM SERPL-MCNC: 2.2 MG/DL (ref 1.6–2.4)
MCH RBC QN AUTO: 31.7 PG (ref 26–34)
MCHC RBC AUTO-ENTMCNC: 31.1 G/DL (ref 30–36.5)
MCV RBC AUTO: 101.8 FL (ref 80–99)
MONOCYTES # BLD: 0.3 K/UL (ref 0–1)
MONOCYTES NFR BLD: 5 % (ref 5–13)
NEUTS SEG # BLD: 3 K/UL (ref 1.8–8)
NEUTS SEG NFR BLD: 61 % (ref 32–75)
NRBC # BLD: 0 K/UL (ref 0–0.01)
NRBC BLD-RTO: 0 PER 100 WBC
PLATELET # BLD AUTO: 165 K/UL (ref 150–400)
PMV BLD AUTO: 11.8 FL (ref 8.9–12.9)
POTASSIUM SERPL-SCNC: 3.3 MMOL/L (ref 3.5–5.1)
PROT SERPL-MCNC: 6.6 G/DL (ref 6.4–8.2)
RBC # BLD AUTO: 3.38 M/UL (ref 3.8–5.2)
SODIUM SERPL-SCNC: 141 MMOL/L (ref 136–145)
WBC # BLD AUTO: 4.8 K/UL (ref 3.6–11)

## 2021-06-11 PROCEDURE — 85025 COMPLETE CBC W/AUTO DIFF WBC: CPT

## 2021-06-11 PROCEDURE — 36415 COLL VENOUS BLD VENIPUNCTURE: CPT

## 2021-06-11 PROCEDURE — 93306 TTE W/DOPPLER COMPLETE: CPT | Performed by: INTERNAL MEDICINE

## 2021-06-11 PROCEDURE — 93306 TTE W/DOPPLER COMPLETE: CPT

## 2021-06-11 PROCEDURE — 74011250636 HC RX REV CODE- 250/636: Performed by: HOSPITALIST

## 2021-06-11 PROCEDURE — 65660000000 HC RM CCU STEPDOWN

## 2021-06-11 PROCEDURE — 99233 SBSQ HOSP IP/OBS HIGH 50: CPT | Performed by: INTERNAL MEDICINE

## 2021-06-11 PROCEDURE — 74011250637 HC RX REV CODE- 250/637: Performed by: HOSPITALIST

## 2021-06-11 PROCEDURE — 83735 ASSAY OF MAGNESIUM: CPT

## 2021-06-11 PROCEDURE — 80053 COMPREHEN METABOLIC PANEL: CPT

## 2021-06-11 RX ORDER — LOSARTAN POTASSIUM 25 MG/1
25 TABLET ORAL DAILY
Status: DISCONTINUED | OUTPATIENT
Start: 2021-06-12 | End: 2021-06-12 | Stop reason: HOSPADM

## 2021-06-11 RX ADMIN — HEPARIN SODIUM 5000 UNITS: 5000 INJECTION INTRAVENOUS; SUBCUTANEOUS at 06:54

## 2021-06-11 RX ADMIN — HEPARIN SODIUM 5000 UNITS: 5000 INJECTION INTRAVENOUS; SUBCUTANEOUS at 22:09

## 2021-06-11 RX ADMIN — LISINOPRIL 2.5 MG: 5 TABLET ORAL at 08:57

## 2021-06-11 RX ADMIN — SPIRONOLACTONE 25 MG: 25 TABLET ORAL at 08:58

## 2021-06-11 RX ADMIN — POTASSIUM BICARBONATE 40 MEQ: 782 TABLET, EFFERVESCENT ORAL at 14:51

## 2021-06-11 RX ADMIN — POTASSIUM BICARBONATE 40 MEQ: 782 TABLET, EFFERVESCENT ORAL at 19:22

## 2021-06-11 RX ADMIN — FUROSEMIDE 40 MG: 10 INJECTION, SOLUTION INTRAVENOUS at 08:58

## 2021-06-11 RX ADMIN — ASPIRIN 81 MG: 81 TABLET, COATED ORAL at 08:58

## 2021-06-11 RX ADMIN — CARVEDILOL 3.12 MG: 3.12 TABLET, FILM COATED ORAL at 20:43

## 2021-06-11 RX ADMIN — HEPARIN SODIUM 5000 UNITS: 5000 INJECTION INTRAVENOUS; SUBCUTANEOUS at 00:50

## 2021-06-11 RX ADMIN — CARVEDILOL 3.12 MG: 3.12 TABLET, FILM COATED ORAL at 08:58

## 2021-06-11 RX ADMIN — HEPARIN SODIUM 5000 UNITS: 5000 INJECTION INTRAVENOUS; SUBCUTANEOUS at 14:51

## 2021-06-11 NOTE — PROGRESS NOTES
Physician Progress Note      Yocasta Coronado  [facility] #:                  [id number]  :                       3/2/1928  ADMIT DATE:       2021 7:12 PM  [address] [place] DATE:  RESPONDING  PROVIDER #:        Jefrey Lightning MD Kelsie Kanner MD          QUERY TEXT:    Pt admitted with A/C CHF. Noted documentation of Acute Hypoxic Resp Failure on 6/10 by ordered Cardiology consultant, ER Physician has also documented Acute Hypoxic Resp Failure, in pt with O2 Sat 89% on room air on arrival to ER. If possible, please document in progress notes and discharge summary:    The medical record reflects the following:  Risk Factors: admitted with A/C CHF, A-FIB  Clinical Indicators: pt with increased SOB, HR 89, RR 26, O2 SAT 89% on room air, with rales on exam. Documentation per ER Physician of 'Tachypnea and Accessory muscle use', but also 'No resp distress'. Cardiology consultant has documented Acute Hypoxic Resp Failure. Documentation in the H&P that pt was in no distress and had self weaned from the O2. Treatment: Diuresis with Lasix IV, O2 2L. Thank you, if you have any questions please e-mail me at  [email or web address]  [phone]  Options provided:  -- Acute Hypoxic Resp Failure confirmed present on admission  -- Acute Hypoxic Resp Failure ruled out  -- Defer to Cardiology consultant documentation regarding Acute Hypoxic Resp Failure  -- Other - I will add my own diagnosis  -- Disagree - Not applicable / Not valid  -- Disagree - Clinically unable to determine / Unknown  -- Refer to Clinical Documentation Reviewer    PROVIDER RESPONSE TEXT:    The diagnosis of Acute Hypoxic Resp Failure was ruled out.     Query created by: Shanita Castro on 2021 8:35 AM      Electronically signed by:  Jefrey Lightning MD Kelsie Kanner MD 2021 1:24 PM

## 2021-06-11 NOTE — ROUTINE PROCESS
TRANSFER - IN REPORT: 
 
Verbal report received from Blessing(name) on Navya Hernandez  being received from [facility]) for routine progression of care Report consisted of patients Situation, Background, Assessment and  
Recommendations(SBAR). Information from the following report(s) SBAR, Kardex, Intake/Output, MAR and Recent Results was reviewed with the receiving nurse. Opportunity for questions and clarification was provided. Assessment completed upon patients arrival to unit and care assumed.

## 2021-06-11 NOTE — PROGRESS NOTES
Problem: Falls - Risk of  Goal: *Absence of Falls  Description: Document Toby Olson Fall Risk and appropriate interventions in the flowsheet.   Outcome: Progressing Towards Goal  Note: Fall Risk Interventions:  Mobility Interventions: Communicate number of staff needed for ambulation/transfer         Medication Interventions: Teach patient to arise slowly, Evaluate medications/consider consulting pharmacy    Elimination Interventions: Call light in reach, Patient to call for help with toileting needs              Problem: Patient Education: Go to Patient Education Activity  Goal: Patient/Family Education  Outcome: Progressing Towards Goal

## 2021-06-11 NOTE — PROGRESS NOTES
Attempted to schedule hospital follow up PCP appointment. Awaiting call back from the office with appointment information. Pending patient discharge. Art Schaffer, Care Management Specialist.     Spoke with Roanne Primrose, NP will follow up with patient on Monday, 6/14/21.   Will monitor patient over the weekend for discharge planning/date of discharge; including contacting patient's daughter to arrange appointment time of arrival.  Art Schaffer, Care Management Specialist.

## 2021-06-11 NOTE — PROGRESS NOTES
2000: Bedside shift change report given to [place] (oncoming nurse) [facility] Report included the following information SBAR, Intake/Output, Accordion, Recent Results and Cardiac Rhythm a fib.

## 2021-06-11 NOTE — PROGRESS NOTES
Dr. Stone Arias. [phone]            Cardiology Consult/Progress Note      Requesting/referring provider: Lidia Richard NP , Dr Nicanor Leonard    Reason for Consult: Congestive heart failure    Assessment/Plan:  1. Acute on chronic systolic congestive heart failure  2. 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 Long-term persistent atrial fibrillation with controlled ventricular response  4. Hypertension      Patient is seen at the request of Dr. Nicanor Leonard. She appears to be in acute on chronic systolic heart failure. Review of records from Dr. Brooke Rahman office just preexistent cardiomyopathy with likely functional mitral and tricuspid regurgitation which may be contributing to her symptoms. Superimposed bronchitis cannot be ruled out. She seems to be improving with IV diuretics which should be continued. She previously has demonstrated noncompliance/poor compliance with diuretics. Would recommend maintenance of compliance at discharge. Still feeling marginal.  Echo demonstrated mildly reduced LV function about 45%. Mostly has biatrial enlargement and diastolic dysfunction causing MR. Continue current therapies and continue with medical management at this time. NSG reported 29 beats of NSVT - continue on Coreg. Since EF is mildly reduced OK to continue Low dose ACEi. Correct lytes. D/w Dr. Nicanor Leonard, plan for discharge tomorrow.   Transition to oral diuretics and discharge on ACEi/BB      []    High complexity decision making was performed  []    Patient is at high-risk of decompensation with multiple organ involvement  []    Complex/difficult social determinants of health outcomes    Investigations personally reviewed and interpreted  ECG was reviewed and shows atrial fibrillation with controlled ventricular response, nonspecific ST changes including ST depressions in lead V3 through V6  Investigations reviewed  Creatinine slightly elevated with GFR between 40-45. proBNP was elevated. Troponin was normal  HPI: Eboni Stewart, a 80y.o. year-old who presents for evaluation of increasing shortness of breath. She lives in independent care living at [facility] was transferred to [facility] because of reporting progressive shortness of breath. She has baseline dyspnea on exertion and has ability to walk about 40 to 50 yards for her daughter. Lately however she was having dyspnea even walking to the bathroom. No accompanying lower extremity edema was reported. She did report some cough with slightly productive sputum. No chest pain was reported. Review of some records from Dr. Ordonez Media office(patient's prior cardiologist) suggest heart failure with reduced ejection fraction with likely functional mitral and tricuspid regurgitation for which she was recommended diuretics. Apparently patient has not been taking diuretics at the frequency directed by her cardiologist.  No fevers have been reported. She  has a past medical history of CAD (coronary artery disease), Cardiomyopathy ([address] Congestive heart failure ([address] HTN (hypertension), Hyperlipemia, Mitral regurgitation, Mitral valvular regurgitation, Nonrheumatic mitral (valve) insufficiency, Osteoporosis, Paroxysmal atrial fibrillation ([address] Stasis, venous, Tricuspid regurgitation, and Vitamin D deficiency. She also has no past medical history of Anemia, Arthritis, Asthma, Autoimmune disease ([address] Calculus of kidney, Cancer ([address] Chronic kidney disease, Chronic obstructive pulmonary disease ([address] Chronic pain, Contact dermatitis and eczema due to cause, Depression, Diabetes ([address] GERD (gastroesophageal reflux disease), Headache, History of abuse in adulthood, History of abuse in childhood, Liver disease, Psychotic disorder ([address] PUD (peptic ulcer disease), Seizures ([address] Stroke ([address] Thromboembolus ([address] Thyroid disease, or Trauma.   Review of system:Patient reports no CP. She reports no cough/fever/focal neurological deficits/abdominal pain. All other systems negative except as above. Family History   Problem Relation Age of Onset    Heart Failure Mother     Heart Failure Father     Thyroid Cancer Sister     Breast Cancer Other       Social History     Socioeconomic History    Marital status:      Spouse name: Not on file    Number of children: Not on file    Years of education: Not on file    Highest education level: Not on file   Tobacco Use    Smoking status: Never Smoker    Smokeless tobacco: Never Used   Substance and Sexual Activity    Alcohol use: Never    Drug use: Never    Sexual activity: Not Currently     Partners: Male     Social Determinants of Health     Financial Resource Strain:     Difficulty of Paying Living Expenses:    Food Insecurity:     Worried About Running Out of Food in the Last Year:     [address] in the Last Year:    Transportation Needs:     Lack of Transportation (Medical):  Lack of Transportation (Non-Medical):    Physical Activity:     Days of Exercise per Week:     Minutes of Exercise per Session:    Stress:     Feeling of Stress :    Social Connections:     Frequency of Communication with Friends and Family:     Frequency of Social Gatherings with Friends and Family:     Attends [employer] Services:     Active Member of Clubs or Organizations:     Attends Club or Organization Meetings:     Marital Status:       PE  Vitals:    06/11/21 0548 06/11/21 0654 06/11/21 0825 06/11/21 0857   BP: (!) 103/57 (!) 102/59 (!) 102/59 134/68   Pulse: 76 74  76   Resp: 20 20  22   Temp: 97 °F (36.1 °C) 97.9 °F (36.6 °C)  98.3 °F (36.8 °C)   SpO2:  95%     Weight:   106 lb (48.1 kg)    Height:   4' 9\" (1.448 m)     Body mass index is 22.94 kg/m². General:    Alert, cooperative, no distress. Psychiatric:    Normal Mood and affect    Eye/ENT:      Pupils equal, No asymmetry, Conjunctival pink.  Able to hear voice at normal amplitude   Lungs:      Visibly symmetric chest expansion, No palpable tenderness. Clear to auscultation bilaterally. Heart[de-identified]    Irregular rate and rhythm, S1, S2 variable. Grade 2/6 holosystolic murmur heard at the apex. No click, rub or gallop. No JVD, Normal palpable peripheral pulses. No cyanosis   Abdomen:     Soft, non-tender. Bowel sounds normal. No masses,  No      organomegaly. Extremities:   Extremities normal, atraumatic, no edema. Neurologic:   CN II-XII grossly intact.  No gross focal deficits           Recent Labs:  Lab Results   Component Value Date/Time    Cholesterol, total 154 11/18/2019 01:06 PM    HDL Cholesterol 56 11/18/2019 01:06 PM    LDL, calculated 81 11/18/2019 01:06 PM    Triglyceride 85 11/18/2019 01:06 PM     Lab Results   Component Value Date/Time    Creatinine 0.96 06/11/2021 07:01 AM     Lab Results   Component Value Date/Time    BUN 22 (H) 06/11/2021 07:01 AM     Lab Results   Component Value Date/Time    Potassium 3.3 (L) 06/11/2021 07:01 AM     Lab Results   Component Value Date/Time    Hemoglobin A1c 5.2 11/18/2019 01:06 PM     Lab Results   Component Value Date/Time    HGB 10.7 (L) 06/11/2021 07:01 AM     Lab Results   Component Value Date/Time    PLATELET 633 49/15/9081 07:01 AM       Reviewed:  Past Medical History:   Diagnosis Date    CAD (coronary artery disease)     Cardiomyopathy ([address]     Congestive heart failure (HCC)     HTN (hypertension)     Hyperlipemia     Mitral regurgitation     Mitral valvular regurgitation     Nonrheumatic mitral (valve) insufficiency     Osteoporosis     Paroxysmal atrial fibrillation (HCC)     Stasis, venous     Tricuspid regurgitation     Vitamin D deficiency      Social History     Tobacco Use   Smoking Status Never Smoker   Smokeless Tobacco Never Used     Social History     Substance and Sexual Activity   Alcohol Use Never     Allergies   Allergen Reactions    Evista [Raloxifene] Other (comments)    Levaquin [Levofloxacin] Unknown (comments)    Pcn [Penicillins] Unknown (comments)    Xarelto [Rivaroxaban] Rash     Family History   Problem Relation Age of Onset    Heart Failure Mother     Heart Failure Father     Thyroid Cancer Sister     Breast Cancer Other         Current Facility-Administered Medications   Medication Dose Route Frequency    aspirin delayed-release tablet 81 mg  81 mg Oral DAILY    furosemide (LASIX) injection 40 mg  40 mg IntraVENous DAILY    carvediloL (COREG) tablet 3.125 mg  3.125 mg Oral BID    lisinopriL (PRINIVIL, ZESTRIL) tablet 2.5 mg  2.5 mg Oral DAILY    heparin (porcine) injection 5,000 Units  5,000 Units SubCUTAneous Q8H    spironolactone (ALDACTONE) tablet 25 mg  25 mg Oral DAILY       NADIA Foote-C06/11/21   Katelyn Jenkins MD    [facility] heart and Vascular [place]  [facility],  [place], [place]. [phone]

## 2021-06-11 NOTE — PROGRESS NOTES
Is 11,009     Dr. Vanessa Fajardo. [phone]            Cardiology Consult/Progress Note      Requesting/referring provider: Jason Perez NP , Dr Ezra Cabot    Reason for Consult: Congestive heart failure    Assessment/Plan:  1. Acute on chronic systolic congestive heart failure  2. 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 Long-term persistent atrial fibrillation with controlled ventricular response  4. Hypertension      Patient is seen at the request of Dr. Ezra Cabot. She appears to be in acute on chronic systolic heart failure. Review of records from Dr. Ordonez Media office just preexistent cardiomyopathy with likely functional mitral and tricuspid regurgitation which may be contributing to her symptoms. Superimposed bronchitis cannot be ruled out. She seems to be improving with IV diuretics which should be continued. She previously has demonstrated noncompliance/poor compliance with diuretics. Would recommend maintenance of compliance at discharge. Still feeling marginal.  Echo demonstrated mildly reduced LV function about 45%. Mostly has biatrial enlargement and diastolic dysfunction causing MR. Continue current therapies and continue with medical management at this time. NSG reported 29 beats of NSVT - continue on Coreg. Since EF is mildly reduced OK to continue Low dose ACEi/ARB. Correct lytes. D/w Dr. Ezra Cabot, plan for discharge tomorrow. Transition to oral diuretics and discharge on beta-blocker, spironolactone. We discussed the cause of her cough should prefer ARB or ACE inhibitors. Hence I replaced her lisinopril with losartan 25 mg daily    Reasonable therapy at discharge would be 40 mg of Lasix daily, current dose of carvedilol, losartan 25 mg and spironolactone 25 mg daily. Patient has been deemed as high fall risk and hence we will not recommend OAC's.   Patient has had this discussion with her prior cardiologist and would not like to go on [facility] at this point of time. Cardiology will sign off for now. []    High complexity decision making was performed  []    Patient is at high-risk of decompensation with multiple organ involvement  []    Complex/difficult social determinants of health outcomes    Investigations personally reviewed and interpreted  ECG was reviewed and shows atrial fibrillation with controlled ventricular response, nonspecific ST changes including ST depressions in lead V3 through V6  Investigations reviewed  Creatinine slightly elevated with GFR between 40-45. proBNP was elevated. Troponin was normal  HPI: Eboni Stewart, a 80y.o. year-old who presents for evaluation of increasing shortness of breath. She lives in independent care living at [facility] was transferred to [facility] because of reporting progressive shortness of breath. She has baseline dyspnea on exertion and has ability to walk about 40 to 50 yards for her daughter. Lately however she was having dyspnea even walking to the bathroom. No accompanying lower extremity edema was reported. She did report some cough with slightly productive sputum. No chest pain was reported. Review of some records from Dr. Ordonez Media office(patient's prior cardiologist) suggest heart failure with reduced ejection fraction with likely functional mitral and tricuspid regurgitation for which she was recommended diuretics. Apparently patient has not been taking diuretics at the frequency directed by her cardiologist.  No fevers have been reported. She  has a past medical history of CAD (coronary artery disease), Cardiomyopathy ([address] Congestive heart failure ([address] HTN (hypertension), Hyperlipemia, Mitral regurgitation, Mitral valvular regurgitation, Nonrheumatic mitral (valve) insufficiency, Osteoporosis, Paroxysmal atrial fibrillation ([address] Stasis, venous, Tricuspid regurgitation, and Vitamin D deficiency.  She also has no past medical history of Anemia, Arthritis, Asthma, Autoimmune disease ([facility] [address] Calculus of kidney, Cancer ([facility] [address] [facility] Chronic kidney disease, Chronic obstructive pulmonary disease ([facility] [address] Chronic pain, Contact dermatitis and eczema due to cause, Depression, Diabetes ([facility] [address] GERD (gastroesophageal reflux disease), Headache, History of abuse in adulthood, History of abuse in childhood, Liver disease, Psychotic disorder ([facility] [address] PUD (peptic ulcer disease), Seizures ([facility] [address] Stroke ([facility] Thromboembolus ([facility] Thyroid disease, or Trauma. Review of system:Patient reports no CP. She reports no cough/fever/focal neurological deficits/abdominal pain. All other systems negative except as above. Family History   Problem Relation Age of Onset    Heart Failure Mother     Heart Failure Father     Thyroid Cancer Sister     Breast Cancer Other       Social History     Socioeconomic History    Marital status:      Spouse name: Not on file    Number of children: Not on file    Years of education: Not on file    Highest education level: Not on file   Tobacco Use    Smoking status: Never Smoker    Smokeless tobacco: Never Used   Substance and Sexual Activity    Alcohol use: Never    Drug use: Never    Sexual activity: Not Currently     Partners: Male     Social Determinants of Health     Financial Resource Strain:     Difficulty of Paying Living Expenses:    Food Insecurity:     Worried About Running Out of Food in the Last Year:     [address] in the Last Year:    Transportation Needs:     Lack of Transportation (Medical):      Lack of Transportation (Non-Medical):    Physical Activity:     Days of Exercise per Week:     Minutes of Exercise per Session:    Stress:     Feeling of Stress :    Social Connections:     Frequency of Communication with Friends and Family:     Frequency of Social Gatherings with Friends and Family:     Attends [employer] Services:     Active Member of Clubs or Organizations:     Attends Club or Organization Meetings:     Marital Status:       PE  Vitals:    06/11/21 0825 06/11/21 0857 06/11/21 1153 06/11/21 1428   BP: (!) 102/59 134/68 131/67 136/62   Pulse:  76 100 91   Resp:  22 22 22   Temp:  98.3 °F (36.8 °C) 97.6 °F (36.4 °C) 98 °F (36.7 °C)   SpO2:   95% 92%   Weight: 106 lb (48.1 kg)      Height: 4' 9\" (1.448 m)       Body mass index is 22.94 kg/m². General:    Alert, cooperative, no distress. Psychiatric:    Normal Mood and affect    Eye/ENT:      Pupils equal, No asymmetry, Conjunctival pink. Able to hear voice at normal amplitude   Lungs:      Visibly symmetric chest expansion, No palpable tenderness. Clear to auscultation bilaterally. Heart[de-identified]    Irregular rate and rhythm, S1, S2 variable. Grade 2/6 holosystolic murmur heard at the apex. No click, rub or gallop. No JVD, Normal palpable peripheral pulses. No cyanosis   Abdomen:     Soft, non-tender. Bowel sounds normal. No masses,  No      organomegaly. Extremities:   Extremities normal, atraumatic, no edema. Neurologic:   CN II-XII grossly intact.  No gross focal deficits           Recent Labs:  Lab Results   Component Value Date/Time    Cholesterol, total 154 11/18/2019 01:06 PM    HDL Cholesterol 56 11/18/2019 01:06 PM    LDL, calculated 81 11/18/2019 01:06 PM    Triglyceride 85 11/18/2019 01:06 PM     Lab Results   Component Value Date/Time    Creatinine 0.96 06/11/2021 07:01 AM     Lab Results   Component Value Date/Time    BUN 22 (H) 06/11/2021 07:01 AM     Lab Results   Component Value Date/Time    Potassium 3.3 (L) 06/11/2021 07:01 AM     Lab Results   Component Value Date/Time    Hemoglobin A1c 5.2 11/18/2019 01:06 PM     Lab Results   Component Value Date/Time    HGB 10.7 (L) 06/11/2021 07:01 AM     Lab Results   Component Value Date/Time    PLATELET 800 89/56/8180 07:01 AM       Reviewed:  Past Medical History:   Diagnosis Date    CAD (coronary artery disease)     Cardiomyopathy ([facility] [address]     Congestive heart failure ([address]     HTN (hypertension)     Hyperlipemia     Mitral regurgitation     Mitral valvular regurgitation     Nonrheumatic mitral (valve) insufficiency     Osteoporosis     Paroxysmal atrial fibrillation (HCC)     Stasis, venous     Tricuspid regurgitation     Vitamin D deficiency      Social History     Tobacco Use   Smoking Status Never Smoker   Smokeless Tobacco Never Used     Social History     Substance and Sexual Activity   Alcohol Use Never     Allergies   Allergen Reactions    Evista [Raloxifene] Other (comments)    Levaquin [Levofloxacin] Unknown (comments)    Pcn [Penicillins] Unknown (comments)    Xarelto [Rivaroxaban] Rash     Family History   Problem Relation Age of Onset    Heart Failure Mother     Heart Failure Father     Thyroid Cancer Sister     Breast Cancer Other         Current Facility-Administered Medications   Medication Dose Route Frequency    potassium bicarb-citric acid (EFFER-K) tablet 40 mEq  40 mEq Oral BID    [START ON 6/12/2021] losartan (COZAAR) tablet 25 mg  25 mg Oral DAILY    aspirin delayed-release tablet 81 mg  81 mg Oral DAILY    furosemide (LASIX) injection 40 mg  40 mg IntraVENous DAILY    carvediloL (COREG) tablet 3.125 mg  3.125 mg Oral BID    heparin (porcine) injection 5,000 Units  5,000 Units SubCUTAneous Q8H    spironolactone (ALDACTONE) tablet 25 mg  25 mg Oral DAILY       NADIA Tuttle-C06/11/21   Ana Duarte MD    [facility] heart and Vascular [place]  [place], [place]. [phone]

## 2021-06-11 NOTE — PROGRESS NOTES
.Transition of Care Plan   RUR: 11% low   Disposition: The disposition plan is pending care provider recommendations   F/U with PCP/Specialist     Transport: Family to transport    Reason for Admission:  Heart failure                      RUR Score:   11% low                  Plan for utilizing home health:    TBD      PCP: First and Last name:  Tang James NP     Name of Practice:    Are you a current patient: Yes/No:    Approximate date of last visit:    Can you participate in a virtual visit with your PCP:                     Current Advanced Directive/Advance Care Plan: DNR      Healthcare Decision Maker:                Primary Decision Maker: Kalina Pablo - Child - [phone]                  Transition of Care Plan:                        CRM met with patient and patient's daughter at bedside Sanjana Posada [phone], introduced self, explained role, verified demographics, and offered assistance. Patient currently living at [facility] independent living. The patient was A&Ox4. Patient is using a cane when outside of the home and does have a rolling walker but does not use. Patient is independent with ADL's/IADL's. Patient uses [facility] to fill prescriptions and does not have any barriers to obtaining them at this time. Per the patient's daughter patient has a  that the family pays to come 1 hour per day during the week to assist the patient with tiding the home and preparing some meals. Patient's daughter also feels that the patient could benefit from a little more assistance in the home mostly for meal prep as her daughter feels she eats better when someone is assisting in preparing her meals. Care Management Interventions  PCP Verified by CM: Yes  Palliative Care Criteria Met (RRAT>21 & CHF Dx)?: No  Mode of Transport at Discharge:  Other (see comment) (Daughter)  Transition of Care Consult (CM Consult): Discharge Planning  MyChart Signup: Yes  Discharge Durable Medical Equipment: No  Health Maintenance Reviewed: Yes  Physical Therapy Consult: Yes  Occupational Therapy Consult: Yes  Speech Therapy Consult: No  Current Support Network: Assisted Living  Confirm Follow Up Transport: Family  [employer] Information Provided?: No  Discharge Location  Discharge Placement: Unable to determine at this time    Mariel Jordan, [address]

## 2021-06-11 NOTE — PROGRESS NOTES
Bedside shift change report given to [address] [place] [address] (oncoming nurse) by Ruslan Rabago (offgoing nurse). Report included the following information SBAR, Kardex, ED Summary, MAR, Recent Results and Cardiac Rhythm Afib.           0542: MT alerted this nurse that the patient had a 29 beat run of VTach. Patient's vitals are stable and patient was assessed. Pt stated she has no SOB and no pain. Notified the hospitalist NP. Orders received and carried out. Problem: Falls - Risk of  Goal: *Absence of Falls  Description: Document Cari Ayad Fall Risk and appropriate interventions in the flowsheet.   Outcome: Progressing Towards Goal  Note: Fall Risk Interventions:  Mobility Interventions: Communicate number of staff needed for ambulation/transfer         Medication Interventions: Teach patient to arise slowly, Patient to call before getting OOB, Evaluate medications/consider consulting pharmacy    Elimination Interventions: Call light in reach, Patient to call for help with toileting needs              Problem: Heart Failure: Day 2  Goal: Medications  Outcome: Progressing Towards Goal  Note: Pt currently takes laxis and metoprolol

## 2021-06-12 VITALS
TEMPERATURE: 98.6 F | DIASTOLIC BLOOD PRESSURE: 64 MMHG | SYSTOLIC BLOOD PRESSURE: 103 MMHG | RESPIRATION RATE: 18 BRPM | WEIGHT: 101.19 LBS | OXYGEN SATURATION: 92 % | BODY MASS INDEX: 21.83 KG/M2 | HEART RATE: 84 BPM | HEIGHT: 57 IN

## 2021-06-12 PROBLEM — I50.9 CHF, ACUTE ON CHRONIC (HCC): Status: RESOLVED | Noted: 2021-06-09 | Resolved: 2021-06-12

## 2021-06-12 PROCEDURE — 74011250636 HC RX REV CODE- 250/636: Performed by: HOSPITALIST

## 2021-06-12 PROCEDURE — 74011250637 HC RX REV CODE- 250/637: Performed by: INTERNAL MEDICINE

## 2021-06-12 PROCEDURE — 74011250637 HC RX REV CODE- 250/637: Performed by: NURSE PRACTITIONER

## 2021-06-12 PROCEDURE — 94760 N-INVAS EAR/PLS OXIMETRY 1: CPT

## 2021-06-12 PROCEDURE — 74011250637 HC RX REV CODE- 250/637: Performed by: HOSPITALIST

## 2021-06-12 RX ORDER — SPIRONOLACTONE 25 MG/1
25 TABLET ORAL DAILY
Qty: 30 TABLET | Refills: 0 | Status: SHIPPED | COMMUNITY
Start: 2021-06-12 | End: 2021-07-12

## 2021-06-12 RX ORDER — ACETAMINOPHEN 325 MG/1
650 TABLET ORAL
Status: DISCONTINUED | OUTPATIENT
Start: 2021-06-12 | End: 2021-06-12 | Stop reason: HOSPADM

## 2021-06-12 RX ORDER — FUROSEMIDE 40 MG/1
40 TABLET ORAL DAILY
Qty: 30 TABLET | Refills: 0 | Status: SHIPPED | COMMUNITY
Start: 2021-06-12 | End: 2021-07-12

## 2021-06-12 RX ORDER — LOSARTAN POTASSIUM 25 MG/1
25 TABLET ORAL DAILY
Qty: 30 TABLET | Refills: 0 | Status: SHIPPED | COMMUNITY
Start: 2021-06-12 | End: 2021-07-12

## 2021-06-12 RX ORDER — CARVEDILOL 3.12 MG/1
3.12 TABLET ORAL 2 TIMES DAILY
Qty: 60 TABLET | Refills: 0 | Status: SHIPPED | COMMUNITY
Start: 2021-06-12 | End: 2021-07-12

## 2021-06-12 RX ADMIN — ACETAMINOPHEN 650 MG: 325 TABLET ORAL at 03:06

## 2021-06-12 RX ADMIN — HEPARIN SODIUM 5000 UNITS: 5000 INJECTION INTRAVENOUS; SUBCUTANEOUS at 07:18

## 2021-06-12 RX ADMIN — LOSARTAN POTASSIUM 25 MG: 25 TABLET, FILM COATED ORAL at 09:03

## 2021-06-12 RX ADMIN — ASPIRIN 81 MG: 81 TABLET, COATED ORAL at 09:03

## 2021-06-12 RX ADMIN — SPIRONOLACTONE 25 MG: 25 TABLET ORAL at 09:03

## 2021-06-12 RX ADMIN — CARVEDILOL 3.12 MG: 3.12 TABLET, FILM COATED ORAL at 07:18

## 2021-06-12 RX ADMIN — FUROSEMIDE 40 MG: 10 INJECTION, SOLUTION INTRAVENOUS at 09:04

## 2021-06-12 RX ADMIN — POTASSIUM BICARBONATE 40 MEQ: 782 TABLET, EFFERVESCENT ORAL at 09:24

## 2021-06-12 NOTE — DISCHARGE SUMMARY
Discharge Summary       PATIENT ID: Farhat Galicia  MRN: [id number]   YOB: 1928    DATE OF ADMISSION: 6/9/2021  7:12 PM    DATE OF DISCHARGE: 6/11/21   PRIMARY CARE PROVIDER: Lidia Richard NP     ATTENDING PHYSICIAN: Soco Agosto  DISCHARGING PROVIDER: Rad Shaw MD    To contact this individual call [phone] and ask the  to page. If unavailable ask to be transferred the Adult Hospitalist Department. CONSULTATIONS: IP CONSULT TO CARDIOLOGY    PROCEDURES/SURGERIES: * No surgery found *    ADMITTING DIAGNOSES & HOSPITAL COURSE:   Radha Reynoso is a 80 y. o. female who presents with shortness of breath.  History obtained from the patient at bedside patient said that she is residing in [facility] and yesterday night she had some feeling of shortness of breath and she called her daughters.  Subsequent communication was made through the [facility] staff and patient was sent to [facility] for further management.  Patient is a poor historian but she could tell me that she is at [facility] and she has 3 daughters who takes care of her and currently she has no symptoms.      Assessment & Plan:      1.  Acute on chronic CHF NYHA 2-3-POA-- NYHA 1 at d/c   -Continue Lasix 40 mg daily coreg 3.125/ arb 50 mg daily and aldactone 25 mg daily  -Non-ischemic COM: ef 25-30%   Severe MR, Moderate TR  - f/u with cardiology      2.  Persistent Atrial fibrillation  - Seen on EKG patient's rate is below 100  - Previously on [address], however this was stopped 2/2 hgb of 6.  Previously recommended ICD, however, she declined. C/w BB  -Patient is elderly with fall risk defer anticoagulation to cardiology     3.  Mild Cognitive Impairment:     D/w daughter at bedside      Code status: DNR             DISCHARGE DIAGNOSES / PLAN:      1. D/c to home      ADDITIONAL CARE RECOMMENDATIONS:        PENDING TEST RESULTS:   At the time of discharge the following test results are still pending: FOLLOW UP APPOINTMENTS:    Follow-up Information     Follow up With Specialties Details Why Contact Info    Iesha Mcgregor MD Cardiology Go on 2021 at 1:40 pm on 21 arrive at 1:30 pm.  [address] [place] [address]      Lucho Lerma NP Nurse Practitioner In 1 week  [facility] [place] [id number]  [phone]               DIET: Cardiac Diet    ACTIVITY: Activity as tolerated    WOUND CARE:     EQUIPMENT needed:       DISCHARGE MEDICATIONS:  Current Discharge Medication List      START taking these medications    Details   losartan (COZAAR) 25 mg tablet Take 1 Tablet by mouth daily for 30 days. Qty: 30 Tablet, Refills: 0  Start date: 2021, End date: 2021         CONTINUE these medications which have CHANGED    Details   carvediloL (COREG) 3.125 mg tablet Take 1 Tablet by mouth two (2) times a day for 30 days. Qty: 60 Tablet, Refills: 0  Start date: 2021, End date: 2021      furosemide (LASIX) 40 mg tablet Take 1 Tablet by mouth daily for 30 days. Qty: 30 Tablet, Refills: 0  Start date: 2021, End date: 2021      spironolactone (ALDACTONE) 25 mg tablet Take 1 Tablet by mouth daily for 30 days. Qty: 30 Tablet, Refills: 0  Start date: 2021, End date: 2021         CONTINUE these medications which have NOT CHANGED    Details   ergocalciferol (ERGOCALCIFEROL) 50,000 unit capsule Take 1 Cap by mouth every seven (7) days. Indications: Vitamin D Deficiency (High Dose Therapy)  Qty: 12 Cap, Refills: 1    Comments: Deliver to [place]  Associated Diagnoses: Vitamin D deficiency      cholecalciferol (VITAMIN D3) (2,000 UNITS /50 MCG) cap capsule Take 2,000 Units by mouth daily.  Indications: low vitamin D levels, Vitamin D Deficiency (High Dose Therapy)  Qty: 90 Cap, Refills: 1    Comments: Deliver to [place]  Associated Diagnoses: Vitamin D deficiency      cyanocobalamin (VITAMIN B12) 500 mcg tablet Take 1 Tab by mouth daily.  Qty: 90 Tab, Refills: 1    Comments: Deliver to [address] Diagnoses: Vitamin B12 deficiency disease      aspirin delayed-release 81 mg tablet Take  by mouth daily. NOTIFY YOUR PHYSICIAN FOR ANY OF THE FOLLOWING:   Fever over 101 degrees for 24 hours. Chest pain, shortness of breath, fever, chills, nausea, vomiting, diarrhea, change in mentation, falling, weakness, bleeding. Severe pain or pain not relieved by medications. Or, any other signs or symptoms that you may have questions about. DISPOSITION:  x  Home With:   OT  PT  HH  RN       Long term SNF/Inpatient Rehab    Independent/assisted living    Hospice    Other:       PATIENT CONDITION AT DISCHARGE:     Functional status    Poor     Deconditioned    x Independent      Cognition   x  Lucid     Forgetful     Dementia      Catheters/lines (plus indication)    Marquez     PICC     PEG    x None      Code status    x Full code     DNR      PHYSICAL EXAMINATION AT DISCHARGE:  General:          Alert, cooperative, no distress, appears stated age. HEENT:           Atraumatic, anicteric sclerae, pink conjunctivae                          No oral ulcers, mucosa moist, throat clear, dentition fair  Neck:               Supple, symmetrical  Lungs:             Clear to auscultation bilaterally. No Wheezing or Rhonchi. No rales. Chest wall:      No tenderness  No Accessory muscle use. Heart:              Regular  rhythm,  No  murmur   No edema  Abdomen:        Soft, non-tender. Not distended. Bowel sounds normal  Extremities:     No cyanosis. No clubbing,                            Skin turgor normal, Capillary refill normal  Skin:                Not pale. Not Jaundiced  No rashes   Psych:             Not anxious or agitated.   Neurologic:      Alert, moves all extremities, answers questions appropriately and responds to commands       CHRONIC MEDICAL DIAGNOSES:  Problem List as of 6/12/2021 Date Reviewed: 6/12/2021        Codes Class Noted - Resolved    CAD (coronary artery disease) (Chronic) ICD-10-CM: I25.10  ICD-9-CM: 414.00  11/18/2019 - Present        Cardiomyopathy ([facility] (Chronic) ICD-10-CM: I42.9  ICD-9-CM: 425.4  11/18/2019 - Present        Congestive heart failure (HCC) (Chronic) ICD-10-CM: I50.9  ICD-9-CM: 428.0  11/18/2019 - Present        HTN (hypertension) (Chronic) ICD-10-CM: I10  ICD-9-CM: 401.9  11/18/2019 - Present        Hyperlipemia (Chronic) ICD-10-CM: E78.5  ICD-9-CM: 272.4  11/18/2019 - Present        Mitral regurgitation (Chronic) ICD-10-CM: I34.0  ICD-9-CM: 424.0  11/18/2019 - Present        Osteoporosis (Chronic) ICD-10-CM: M81.0  ICD-9-CM: 733.00  11/18/2019 - Present        Paroxysmal atrial fibrillation (HCC) (Chronic) ICD-10-CM: I48.0  ICD-9-CM: 427.31  11/18/2019 - Present        Stasis, venous (Chronic) ICD-10-CM: I87.8  ICD-9-CM: 459.81  11/18/2019 - Present        Tricuspid regurgitation (Chronic) ICD-10-CM: I07.1  ICD-9-CM: 397.0  11/18/2019 - Present        Vitamin D deficiency (Chronic) ICD-10-CM: E55.9  ICD-9-CM: 268.9  11/18/2019 - Present        RESOLVED: CHF, acute on chronic ([facility] ICD-10-CM: I50.9  ICD-9-CM: 428.0  6/9/2021 - 6/12/2021              Greater than 30  minutes were spent with the patient on counseling and coordination of care    Signed:   Fabricio Perkins MD  6/12/2021  9:06 AM

## 2021-06-12 NOTE — PROGRESS NOTES
Bedside and Verbal shift change report given to Matthew Garibay (oncoming nurse) by Jaqueline Patiño (offgoing nurse). Report included the following information SBAR, Kardex and MAR.

## 2021-06-12 NOTE — PROGRESS NOTES
Problem: Falls - Risk of  Goal: *Absence of Falls  Description: Document Celsa Garcia Fall Risk and appropriate interventions in the flowsheet.   Outcome: Resolved/Met     Problem: Patient Education: Go to Patient Education Activity  Goal: Patient/Family Education  Outcome: Resolved/Met     Problem: Patient Education: Go to Patient Education Activity  Goal: Patient/Family Education  Outcome: Resolved/Met     Problem: Heart Failure: Day 1  Goal: Off Pathway (Use only if patient is Off Pathway)  Outcome: Resolved/Met  Goal: Activity/Safety  Outcome: Resolved/Met  Goal: Consults, if ordered  Outcome: Resolved/Met  Goal: Diagnostic Test/Procedures  Outcome: Resolved/Met  Goal: Nutrition/Diet  Outcome: Resolved/Met  Goal: Discharge Planning  Outcome: Resolved/Met  Goal: Medications  Outcome: Resolved/Met  Goal: Respiratory  Outcome: Resolved/Met  Goal: Treatments/Interventions/Procedures  Outcome: Resolved/Met  Goal: Psychosocial  Outcome: Resolved/Met  Goal: *Oxygen saturation within defined limits  Outcome: Resolved/Met  Goal: *Hemodynamically stable  Outcome: Resolved/Met  Goal: *Optimal pain control at patient's stated goal  Outcome: Resolved/Met  Goal: *Anxiety reduced or absent  Outcome: Resolved/Met

## 2021-06-12 NOTE — PROGRESS NOTES
Problem: Falls - Risk of  Goal: *Absence of Falls  Description: Document Celsa Garcia Fall Risk and appropriate interventions in the flowsheet.   Outcome: Progressing Towards Goal  Note: Fall Risk Interventions:  Mobility Interventions: Communicate number of staff needed for ambulation/transfer, Patient to call before getting OOB         Medication Interventions: Teach patient to arise slowly, Patient to call before getting OOB    Elimination Interventions: Bed/chair exit alarm, Call light in reach              Problem: Patient Education: Go to Patient Education Activity  Goal: Patient/Family Education  Outcome: Progressing Towards Goal     Problem: Patient Education: Go to Patient Education Activity  Goal: Patient/Family Education  Outcome: Progressing Towards Goal     Problem: Heart Failure: Day 1  Goal: Off Pathway (Use only if patient is Off Pathway)  Outcome: Progressing Towards Goal  Goal: Activity/Safety  Outcome: Progressing Towards Goal  Goal: Consults, if ordered  Outcome: Progressing Towards Goal  Goal: Diagnostic Test/Procedures  Outcome: Progressing Towards Goal  Goal: Nutrition/Diet  Outcome: Progressing Towards Goal  Goal: Discharge Planning  Outcome: Progressing Towards Goal  Goal: Medications  Outcome: Progressing Towards Goal  Goal: Respiratory  Outcome: Progressing Towards Goal  Goal: Treatments/Interventions/Procedures  Outcome: Progressing Towards Goal  Goal: Psychosocial  Outcome: Progressing Towards Goal  Goal: *Oxygen saturation within defined limits  Outcome: Progressing Towards Goal  Goal: *Hemodynamically stable  Outcome: Progressing Towards Goal  Goal: *Optimal pain control at patient's stated goal  Outcome: Progressing Towards Goal  Goal: *Anxiety reduced or absent  Outcome: Progressing Towards Goal     Problem: Heart Failure: Day 2  Goal: Off Pathway (Use only if patient is Off Pathway)  Outcome: Progressing Towards Goal  Goal: Activity/Safety  Outcome: Progressing Towards Goal  Goal: Consults, if ordered  Outcome: Progressing Towards Goal  Goal: Diagnostic Test/Procedures  Outcome: Progressing Towards Goal  Goal: Nutrition/Diet  Outcome: Progressing Towards Goal  Goal: Discharge Planning  Outcome: Progressing Towards Goal  Goal: Medications  Outcome: Progressing Towards Goal  Goal: Respiratory  Outcome: Progressing Towards Goal  Goal: Treatments/Interventions/Procedures  Outcome: Progressing Towards Goal  Goal: Psychosocial  Outcome: Progressing Towards Goal  Goal: *Oxygen saturation within defined limits  Outcome: Progressing Towards Goal  Goal: *Hemodynamically stable  Outcome: Progressing Towards Goal  Goal: *Optimal pain control at patient's stated goal  Outcome: Progressing Towards Goal  Goal: *Anxiety reduced or absent  Outcome: Progressing Towards Goal  Goal: *Demonstrates progressive activity  Outcome: Progressing Towards Goal     Problem: Heart Failure: Day 3  Goal: Off Pathway (Use only if patient is Off Pathway)  Outcome: Progressing Towards Goal  Goal: Activity/Safety  Outcome: Progressing Towards Goal  Goal: Diagnostic Test/Procedures  Outcome: Progressing Towards Goal  Goal: Nutrition/Diet  Outcome: Progressing Towards Goal  Goal: Discharge Planning  Outcome: Progressing Towards Goal  Goal: Medications  Outcome: Progressing Towards Goal  Goal: Respiratory  Outcome: Progressing Towards Goal  Goal: Treatments/Interventions/Procedures  Outcome: Progressing Towards Goal  Goal: Psychosocial  Outcome: Progressing Towards Goal  Goal: *Oxygen saturation within defined limits  Outcome: Progressing Towards Goal  Goal: *Hemodynamically stable  Outcome: Progressing Towards Goal  Goal: *Optimal pain control at patient's stated goal  Outcome: Progressing Towards Goal  Goal: *Anxiety reduced or absent  Outcome: Progressing Towards Goal  Goal: *Demonstrates progressive activity  Outcome: Progressing Towards Goal     Problem: Heart Failure: Day 4  Goal: Off Pathway (Use only if patient is Off Pathway)  Outcome: Progressing Towards Goal  Goal: Activity/Safety  Outcome: Progressing Towards Goal  Goal: Diagnostic Test/Procedures  Outcome: Progressing Towards Goal  Goal: Nutrition/Diet  Outcome: Progressing Towards Goal  Goal: Discharge Planning  Outcome: Progressing Towards Goal  Goal: Medications  Outcome: Progressing Towards Goal  Goal: Respiratory  Outcome: Progressing Towards Goal  Goal: Treatments/Interventions/Procedures  Outcome: Progressing Towards Goal  Goal: Psychosocial  Outcome: Progressing Towards Goal  Goal: *Oxygen saturation within defined limits  Outcome: Progressing Towards Goal  Goal: *Hemodynamically stable  Outcome: Progressing Towards Goal  Goal: *Optimal pain control at patient's stated goal  Outcome: Progressing Towards Goal  Goal: *Anxiety reduced or absent  Outcome: Progressing Towards Goal  Goal: *Demonstrates progressive activity  Outcome: Progressing Towards Goal     Problem: Heart Failure: Day 5  Goal: Off Pathway (Use only if patient is Off Pathway)  Outcome: Progressing Towards Goal  Goal: Activity/Safety  Outcome: Progressing Towards Goal  Goal: Diagnostic Test/Procedures  Outcome: Progressing Towards Goal  Goal: Nutrition/Diet  Outcome: Progressing Towards Goal  Goal: Discharge Planning  Outcome: Progressing Towards Goal  Goal: Medications  Outcome: Progressing Towards Goal  Goal: Respiratory  Outcome: Progressing Towards Goal  Goal: Treatments/Interventions/Procedures  Outcome: Progressing Towards Goal  Goal: Psychosocial  Outcome: Progressing Towards Goal     Problem: Heart Failure: Discharge Outcomes  Goal: *Demonstrates ability to perform prescribed activity without shortness of breath or discomfort  Outcome: Progressing Towards Goal  Goal: *Left ventricular function assessment completed prior to or during stay, or planned for post-discharge  Outcome: Progressing Towards Goal  Goal: *ACEI prescribed if LVEF less than 40% and no contraindications or ARB prescribed  Outcome: Progressing Towards Goal  Goal: *Verbalizes understanding and describes prescribed diet  Outcome: Progressing Towards Goal  Goal: *Verbalizes understanding/describes prescribed medications  Outcome: Progressing Towards Goal  Goal: *Describes available resources and support systems  Description: (eg: Home Health, Palliative Care, Advanced Medical Directive)  Outcome: Progressing Towards Goal  Goal: *Describes smoking cessation resources  Outcome: Progressing Towards Goal  Goal: *Understands and describes signs and symptoms to report to providers(Stroke Metric)  Outcome: Progressing Towards Goal  Goal: *Describes/verbalizes understanding of follow-up/return appt  Description: (eg: to physicians, diabetes treatment coordinator, and other resources  Outcome: Progressing Towards Goal  Goal: *Describes importance of continuing daily weights and changes to report to physician  Outcome: Progressing Towards Goal

## 2021-06-12 NOTE — PROGRESS NOTES
Bedside shift change report given to Hakeem Steinberg (oncoming nurse) by Alonso Olivares (offgoing nurse). Report included the following information SBAR, Kardex and MAR.

## 2021-06-12 NOTE — PROGRESS NOTES
Pharmacist Discharge Medication Reconciliation    Discharging Provider: Dr. Matilde Todd PMH:   Past Medical History:   Diagnosis Date    CAD (coronary artery disease)     Cardiomyopathy ([address]     Congestive heart failure (HCC)     HTN (hypertension)     Hyperlipemia     Mitral regurgitation     Mitral valvular regurgitation     Nonrheumatic mitral (valve) insufficiency     Osteoporosis     Paroxysmal atrial fibrillation (HCC)     Stasis, venous     Tricuspid regurgitation     Vitamin D deficiency      Chief Complaint for this Admission:   Chief Complaint   Patient presents with    CHF     Allergies: Evista [raloxifene], Levaquin [levofloxacin], Pcn [penicillins], and Xarelto [rivaroxaban]    Discharge Medications:   Current Discharge Medication List        START taking these medications    Details   losartan (COZAAR) 25 mg tablet Take 1 Tablet by mouth daily for 30 days. Qty: 30 Tablet, Refills: 0  Start date: 6/12/2021, End date: 7/12/2021           CONTINUE these medications which have CHANGED    Details   carvediloL (COREG) 3.125 mg tablet Take 1 Tablet by mouth two (2) times a day for 30 days. Qty: 60 Tablet, Refills: 0  Start date: 6/12/2021, End date: 7/12/2021      furosemide (LASIX) 40 mg tablet Take 1 Tablet by mouth daily for 30 days. Qty: 30 Tablet, Refills: 0  Start date: 6/12/2021, End date: 7/12/2021      spironolactone (ALDACTONE) 25 mg tablet Take 1 Tablet by mouth daily for 30 days. Qty: 30 Tablet, Refills: 0  Start date: 6/12/2021, End date: 7/12/2021           CONTINUE these medications which have NOT CHANGED    Details   ergocalciferol (ERGOCALCIFEROL) 50,000 unit capsule Take 1 Cap by mouth every seven (7) days. Indications: Vitamin D Deficiency (High Dose Therapy)  Qty: 12 Cap, Refills: 1    Comments: Deliver to [place]  Associated Diagnoses: Vitamin D deficiency      cholecalciferol (VITAMIN D3) (2,000 UNITS /50 MCG) cap capsule Take 2,000 Units by mouth daily.  Indications: low vitamin D levels, Vitamin D Deficiency (High Dose Therapy)  Qty: 90 Cap, Refills: 1    Comments: Deliver to [place]  Associated Diagnoses: Vitamin D deficiency      cyanocobalamin (VITAMIN B12) 500 mcg tablet Take 1 Tab by mouth daily. Qty: 90 Tab, Refills: 1    Comments: Deliver to [place]  Associated Diagnoses: Vitamin B12 deficiency disease      aspirin delayed-release 81 mg tablet Take  by mouth daily. The patient's chart, MAR and AVS were reviewed by Tenisha Lane RPH.

## 2021-06-12 NOTE — DISCHARGE INSTRUCTIONS
Discharge Instructions       PATIENT ID: Pedro Dove  MRN: [id number]   YOB: 1928    DATE OF ADMISSION: 6/9/2021  7:12 PM    DATE OF DISCHARGE: 6/12/2021    PRIMARY CARE PROVIDER: Janae Jennings NP     ATTENDING PHYSICIAN: Ade Browning MD  DISCHARGING PROVIDER: Jarrod Graff MD    To contact this individual call [phone] and ask the  to page. If unavailable ask to be transferred the Adult Hospitalist Department. DISCHARGE DIAGNOSES Acute CHF    CONSULTATIONS: IP CONSULT TO CARDIOLOGY    PROCEDURES/SURGERIES: * No surgery found *    PENDING TEST RESULTS:   At the time of discharge the following test results are still pending:     FOLLOW UP APPOINTMENTS:   Follow-up Information     Follow up With Specialties Details Why Contact Info    Ping Lopez MD Cardiology Go on 6/14/2021 at 1:40 pm on 6/14/21 arrive at 1:30 pm.  [address] [facility] [address]      Janae Flies, NP Nurse Practitioner In 1 week  [id number] [place] [id number] [phone]             ADDITIONAL CARE RECOMMENDATIONS:     DIET: Cardiac Diet    ACTIVITY: Activity as tolerated    WOUND CARE:   EQUIPMENT needed:       Radiology      DISCHARGE MEDICATIONS:   See Medication Reconciliation Form    · It is important that you take the medication exactly as they are prescribed. · Keep your medication in the bottles provided by the pharmacist and keep a list of the medication names, dosages, and times to be taken in your wallet. · Do not take other medications without consulting your doctor. NOTIFY YOUR PHYSICIAN FOR ANY OF THE FOLLOWING:   Fever over 101 degrees for 24 hours. Chest pain, shortness of breath, fever, chills, nausea, vomiting, diarrhea, change in mentation, falling, weakness, bleeding. Severe pain or pain not relieved by medications. Or, any other signs or symptoms that you may have questions about.       DISPOSITION:   x Home With:   OT  PT  HH  RN       SNF/Inpatient Rehab/LTAC    Independent/assisted living    Hospice    Other:     CDMP Checked:   Yes x     PROBLEM LIST Updated:  Yes x       Signed:   Ioana Romero MD  6/12/2021  8:47 AM

## 2021-06-14 ENCOUNTER — TELEPHONE (OUTPATIENT)
Dept: CASE MANAGEMENT | Age: 86
End: 2021-06-14

## 2021-06-14 ENCOUNTER — PATIENT OUTREACH (OUTPATIENT)
Dept: CASE MANAGEMENT | Age: 86
End: 2021-06-14

## 2021-06-14 NOTE — PROGRESS NOTES
Care Transitions Initial Call    Call within 2 business days of discharge: Yes     Patient: Rosevelt Hamman Patient : 3/2/1928 MRN: [id number]    Last Discharge [address]       Complaint Diagnosis Description Type Department Provider    21 CHF Dyspnea, unspecified type . .. ED to Hosp-Admission (Discharged) (ADMIT) Sean Magdaleno MD; Argyle Ayad. ..        Pt./Family decline Care Management, DAVE episode closed. - W Logan Cardona

## 2021-06-14 NOTE — TELEPHONE ENCOUNTER
Attempted to reach patient. Spoke with caregiver. Instructed to call patient's daughter Regina Kocher. Called message left. Message left that follow up with cardiology was on 6/15/21 not today. Called caregiver back, also explained patient does not have a follow up appointment today and has one tomorrow with cardiology.

## 2021-06-16 ENCOUNTER — OFFICE VISIT (OUTPATIENT)
Dept: CARDIOLOGY CLINIC | Age: 86
End: 2021-06-16
Payer: MEDICARE

## 2021-06-16 ENCOUNTER — TELEPHONE (OUTPATIENT)
Dept: CARDIOLOGY CLINIC | Age: 86
End: 2021-06-16

## 2021-06-16 VITALS
HEART RATE: 86 BPM | WEIGHT: 100.6 LBS | SYSTOLIC BLOOD PRESSURE: 94 MMHG | RESPIRATION RATE: 12 BRPM | HEIGHT: 57 IN | DIASTOLIC BLOOD PRESSURE: 60 MMHG | BODY MASS INDEX: 21.7 KG/M2 | OXYGEN SATURATION: 97 %

## 2021-06-16 DIAGNOSIS — Z09 HOSPITAL DISCHARGE FOLLOW-UP: Primary | ICD-10-CM

## 2021-06-16 PROCEDURE — 1111F DSCHRG MED/CURRENT MED MERGE: CPT | Performed by: INTERNAL MEDICINE

## 2021-06-16 PROCEDURE — 99204 OFFICE O/P NEW MOD 45 MIN: CPT | Performed by: INTERNAL MEDICINE

## 2021-06-16 PROCEDURE — G0463 HOSPITAL OUTPT CLINIC VISIT: HCPCS | Performed by: INTERNAL MEDICINE

## 2021-06-16 PROCEDURE — 1090F PRES/ABSN URINE INCON ASSESS: CPT | Performed by: INTERNAL MEDICINE

## 2021-06-16 NOTE — PATIENT INSTRUCTIONS
Please see Claudio Trotter in 2 weeks in 4 weeks she will communicate with me on your progress. I would like you to continue to see her monthly for surveillance of your blood pressure and fluid status. We will have you see Kaycee Ng nurse practitioner in 3 months and back to me in 6 months if all is going well!     For now take your Lasix every day check for swelling 3 times a week and take your other prescriptions as ordered

## 2021-06-16 NOTE — TELEPHONE ENCOUNTER
6/16/2021 patients daughter will call to schedule 3 month follow up with Ann Javier NP & 6 month follow up with Dr. Dai Fajardo
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
## 2021-06-20 NOTE — CARDIO/PULMONARY
Cardiac Rehab: Consult received and chart reviewed. No previous echo noted and current echo pending. Per EMR, patient is not very active, ambulates with walker, is frail, and does not drive. Will follow for echo results.  Rudy Da Silva RN 
 
 
  

Implemented All Fall Risk Interventions:  [place] to call system. Call bell, personal items and telephone within reach. Instruct patient to call for assistance. Room bathroom lighting operational. Non-slip footwear when patient is off stretcher. Physically safe environment: no spills, clutter or unnecessary equipment. Stretcher in lowest position, wheels locked, appropriate side rails in place. Provide visual cue, wrist band, yellow gown, etc. Monitor gait and stability. Monitor for mental status changes and reorient to person, place, and time. Review medications for side effects contributing to fall risk. Reinforce activity limits and safety measures with patient and family.

## 2021-07-16 ENCOUNTER — PATIENT OUTREACH (OUTPATIENT)
Dept: CASE MANAGEMENT | Age: 86
End: 2021-07-16

## 2022-03-18 PROBLEM — E55.9 VITAMIN D DEFICIENCY: Status: ACTIVE | Noted: 2019-11-18

## 2022-03-18 PROBLEM — M81.0 OSTEOPOROSIS: Status: ACTIVE | Noted: 2019-11-18

## 2022-03-18 PROBLEM — I25.10 CAD (CORONARY ARTERY DISEASE): Status: ACTIVE | Noted: 2019-11-18

## 2022-03-18 PROBLEM — E78.5 HYPERLIPEMIA: Status: ACTIVE | Noted: 2019-11-18

## 2022-03-18 PROBLEM — I07.1 TRICUSPID REGURGITATION: Status: ACTIVE | Noted: 2019-11-18

## 2022-03-19 PROBLEM — I10 HTN (HYPERTENSION): Status: ACTIVE | Noted: 2019-11-18

## 2022-03-19 PROBLEM — I34.0 MITRAL REGURGITATION: Status: ACTIVE | Noted: 2019-11-18

## 2022-03-19 PROBLEM — I42.9 CARDIOMYOPATHY (HCC): Status: ACTIVE | Noted: 2019-11-18

## 2022-03-19 PROBLEM — I50.9 CONGESTIVE HEART FAILURE (HCC): Status: ACTIVE | Noted: 2019-11-18

## 2022-03-19 PROBLEM — I87.8 STASIS, VENOUS: Status: ACTIVE | Noted: 2019-11-18

## 2022-03-20 PROBLEM — I48.0 PAROXYSMAL ATRIAL FIBRILLATION (HCC): Status: ACTIVE | Noted: 2019-11-18

## 2023-05-12 RX ORDER — ASPIRIN 81 MG/1
TABLET ORAL DAILY
COMMUNITY

## 2023-05-12 RX ORDER — ERGOCALCIFEROL 1.25 MG/1
CAPSULE ORAL
COMMUNITY
Start: 2019-11-25